# Patient Record
Sex: FEMALE | Race: WHITE | Employment: OTHER | URBAN - METROPOLITAN AREA
[De-identification: names, ages, dates, MRNs, and addresses within clinical notes are randomized per-mention and may not be internally consistent; named-entity substitution may affect disease eponyms.]

---

## 2017-01-04 ENCOUNTER — OFFICE VISIT (OUTPATIENT)
Dept: DERMATOLOGY CLINIC | Facility: CLINIC | Age: 82
End: 2017-01-04

## 2017-01-04 DIAGNOSIS — B07.9 VIRAL WARTS, UNSPECIFIED TYPE: ICD-10-CM

## 2017-01-04 DIAGNOSIS — Z85.828 HISTORY OF SKIN CANCER: ICD-10-CM

## 2017-01-04 DIAGNOSIS — L30.9 DERMATITIS: Primary | ICD-10-CM

## 2017-01-04 DIAGNOSIS — L82.1 SEBORRHEIC KERATOSES: ICD-10-CM

## 2017-01-04 PROCEDURE — G0463 HOSPITAL OUTPT CLINIC VISIT: HCPCS | Performed by: DERMATOLOGY

## 2017-01-04 PROCEDURE — 99213 OFFICE O/P EST LOW 20 MIN: CPT | Performed by: DERMATOLOGY

## 2017-01-27 ENCOUNTER — PRIOR ORIGINAL RECORDS (OUTPATIENT)
Dept: OTHER | Age: 82
End: 2017-01-27

## 2017-01-30 NOTE — PROGRESS NOTES
Rocco Lambert is a 80year old female. Patient presents with:  Warts: Pt here for F/U Warts to bilateral hands. LOV 12/26/16. Dextrose; Levofloxacin; Sulfa Antibiotics    Current Outpatient Prescriptions:   AmLODIPine Besylate 5 MG Oral Tab  Disp:  Rfl:    simvastatin (ZOCOR) 20 MG Oral Tab  Disp:  Rfl:    Losartan Potassium (COZAAR) 50 MG Oral Tab  Disp:  Rfl: 5   cimetidine (TAGAMET) 400 MG Oral Tab Take 1 tablet (400 mg total) by mouth 2 (two) times daily.  Disp: 60 tablet Rfl: 1 Family History   Problem Relation Age of Onset   • Cancer Sister      melanoma   • Melanoma Sister                       HPI :      Patient presents with:  Warts: Pt here for F/U Warts to bilateral hands. LOV 12/26/16.      Patient concerned with rash a improvement. Pathophysiology discussed with patient. Therapeutic options reviewed. See  Medications in grid. Instructions reviewed at length. General skin care questions answered. Reassurance regarding benign skin lesions. Signs and symptoms of sk

## 2017-02-22 ENCOUNTER — LAB ENCOUNTER (OUTPATIENT)
Dept: LAB | Age: 82
End: 2017-02-22
Attending: FAMILY MEDICINE
Payer: MEDICARE

## 2017-02-22 ENCOUNTER — PRIOR ORIGINAL RECORDS (OUTPATIENT)
Dept: OTHER | Age: 82
End: 2017-02-22

## 2017-02-22 DIAGNOSIS — E78.2 MIXED HYPERLIPIDEMIA: Primary | ICD-10-CM

## 2017-02-22 LAB
ALT SERPL-CCNC: 14 U/L (ref 14–54)
AST SERPL-CCNC: 15 U/L (ref 15–41)
CHOLEST SERPL-MCNC: 174 MG/DL (ref 110–200)
HDLC SERPL-MCNC: 57 MG/DL
LDLC SERPL CALC-MCNC: 91 MG/DL (ref 0–99)
NONHDLC SERPL-MCNC: 117 MG/DL
TRIGL SERPL-MCNC: 131 MG/DL (ref 1–149)

## 2017-02-22 PROCEDURE — 80061 LIPID PANEL: CPT

## 2017-02-22 PROCEDURE — 84460 ALANINE AMINO (ALT) (SGPT): CPT

## 2017-02-22 PROCEDURE — 84450 TRANSFERASE (AST) (SGOT): CPT

## 2017-02-22 PROCEDURE — 36415 COLL VENOUS BLD VENIPUNCTURE: CPT

## 2017-02-27 ENCOUNTER — HOSPITAL ENCOUNTER (OUTPATIENT)
Dept: GENERAL RADIOLOGY | Age: 82
Discharge: HOME | End: 2017-02-27
Attending: FAMILY MEDICINE
Payer: MEDICARE

## 2017-02-27 DIAGNOSIS — M25.569 KNEE PAIN: ICD-10-CM

## 2017-02-27 PROCEDURE — 73560 X-RAY EXAM OF KNEE 1 OR 2: CPT

## 2017-04-24 ENCOUNTER — OFFICE VISIT (OUTPATIENT)
Dept: SLEEP CENTER | Age: 82
End: 2017-04-24
Attending: FAMILY MEDICINE
Payer: MEDICARE

## 2017-04-24 DIAGNOSIS — Z76.89 SLEEP CONCERN: Primary | ICD-10-CM

## 2017-04-24 PROCEDURE — 95811 POLYSOM 6/>YRS CPAP 4/> PARM: CPT

## 2017-05-07 NOTE — PROCEDURES
[address] [facility]  Accredited by the [facility] ([facility])    PATIENT'S NAME: Efraín Funes   ATTENDING PHYSICIAN: Alva Yu. Gretchen Telles MD   REFERRING PHYSICIAN: Alva Yu.  Gretchen Telles MD   PATIENT ACCOUNT #: [de-identified] LOCATION: [facility] saturation was 88. The mean oxygen saturation was 90% while on BiPAP of 11/7 cm of water. The CPAP titration was switched to BiPAP titration because the patient had persistent and uncontrolled respiratory events while on CPAP.     ASSESSMENT:    1.   Se

## 2017-05-08 ENCOUNTER — PRIOR ORIGINAL RECORDS (OUTPATIENT)
Dept: OTHER | Age: 82
End: 2017-05-08

## 2017-05-09 ENCOUNTER — PRIOR ORIGINAL RECORDS (OUTPATIENT)
Dept: OTHER | Age: 82
End: 2017-05-09

## 2017-05-18 LAB
ALT (SGPT): 14 U/L
AST (SGOT): 15 U/L
CHOLESTEROL, TOTAL: 174 MG/DL
HDL CHOLESTEROL: 57 MG/DL
LDL CHOLESTEROL: 91 MG/DL
TRIGLYCERIDES: 131 MG/DL

## 2017-05-19 ENCOUNTER — PRIOR ORIGINAL RECORDS (OUTPATIENT)
Dept: OTHER | Age: 82
End: 2017-05-19

## 2017-05-31 ENCOUNTER — PRIOR ORIGINAL RECORDS (OUTPATIENT)
Dept: OTHER | Age: 82
End: 2017-05-31

## 2017-06-22 ENCOUNTER — MYAURORA ACCOUNT LINK (OUTPATIENT)
Dept: OTHER | Age: 82
End: 2017-06-22

## 2017-06-23 ENCOUNTER — PRIOR ORIGINAL RECORDS (OUTPATIENT)
Dept: OTHER | Age: 82
End: 2017-06-23

## 2017-07-05 ENCOUNTER — PRIOR ORIGINAL RECORDS (OUTPATIENT)
Dept: OTHER | Age: 82
End: 2017-07-05

## 2018-01-19 ENCOUNTER — PRIOR ORIGINAL RECORDS (OUTPATIENT)
Dept: OTHER | Age: 83
End: 2018-01-19

## 2018-02-07 ENCOUNTER — OFFICE VISIT (OUTPATIENT)
Dept: DERMATOLOGY CLINIC | Facility: CLINIC | Age: 83
End: 2018-02-07

## 2018-02-07 DIAGNOSIS — Z85.820 PERSONAL HISTORY OF MALIGNANT MELANOMA OF SKIN: ICD-10-CM

## 2018-02-07 DIAGNOSIS — D23.5 BENIGN NEOPLASM OF SKIN OF TRUNK, EXCEPT SCROTUM: ICD-10-CM

## 2018-02-07 DIAGNOSIS — D23.4 BENIGN NEOPLASM OF SCALP AND SKIN OF NECK: ICD-10-CM

## 2018-02-07 DIAGNOSIS — D23.60 BENIGN NEOPLASM OF SKIN OF UPPER LIMB, INCLUDING SHOULDER, UNSPECIFIED LATERALITY: ICD-10-CM

## 2018-02-07 DIAGNOSIS — L82.0 INFLAMED SEBORRHEIC KERATOSIS: ICD-10-CM

## 2018-02-07 DIAGNOSIS — L57.0 AK (ACTINIC KERATOSIS): ICD-10-CM

## 2018-02-07 DIAGNOSIS — D23.70 BENIGN NEOPLASM OF SKIN OF LOWER LIMB, INCLUDING HIP, UNSPECIFIED LATERALITY: ICD-10-CM

## 2018-02-07 DIAGNOSIS — L30.9 DERMATITIS: Primary | ICD-10-CM

## 2018-02-07 DIAGNOSIS — L82.1 SEBORRHEIC KERATOSES: ICD-10-CM

## 2018-02-07 DIAGNOSIS — D23.30 BENIGN NEOPLASM OF SKIN OF FACE: ICD-10-CM

## 2018-02-07 DIAGNOSIS — Z85.828 HISTORY OF SKIN CANCER: ICD-10-CM

## 2018-02-07 PROCEDURE — 99213 OFFICE O/P EST LOW 20 MIN: CPT | Performed by: DERMATOLOGY

## 2018-02-07 PROCEDURE — G0463 HOSPITAL OUTPT CLINIC VISIT: HCPCS | Performed by: DERMATOLOGY

## 2018-02-15 ENCOUNTER — MYAURORA ACCOUNT LINK (OUTPATIENT)
Dept: OTHER | Age: 83
End: 2018-02-15

## 2018-02-18 ENCOUNTER — PRIOR ORIGINAL RECORDS (OUTPATIENT)
Dept: OTHER | Age: 83
End: 2018-02-18

## 2018-02-18 NOTE — PROGRESS NOTES
Mario Yao is a 80year old female. HPI:     CC:  Patient presents with:  Full Skin Exam: LOV 1/2017. Pt resquesting full skin exam. Pt concerned with dry lesion on right middle finger. Twin sister has hx of melanoma.  Personal hx of BCC and me Comment:Other reaction(s): Pruritis  Levofloxacin                Comment:Other reaction(s): Pruritis  Sulfa Antibiotics           Comment:Other reaction(s): Hives    Past Medical History:   Diagnosis Date   • Basal cell carcinoma 2014    per Well-developed well-nourished patient alert oriented in no acute distress.   Exam total-body performed, including scalp, head, neck, face,nails, hair, external eyes, including conjunctival mucosa, eyelids, lips external ears, back, chest,/ breasts, axillae, See additional diagnoses. Pros cons of various therapies, risks benefits discussed. Pathophysiology discussed with patient. Therapeutic options reviewed. See  Medications in grid. Instructions reviewed at length.     Benign nevi, seborrheic  keratoses, c

## 2018-02-21 ENCOUNTER — PRIOR ORIGINAL RECORDS (OUTPATIENT)
Dept: OTHER | Age: 83
End: 2018-02-21

## 2018-02-22 ENCOUNTER — APPOINTMENT (OUTPATIENT)
Dept: LAB | Age: 83
End: 2018-02-22
Attending: INTERNAL MEDICINE
Payer: MEDICARE

## 2018-02-22 ENCOUNTER — PRIOR ORIGINAL RECORDS (OUTPATIENT)
Dept: OTHER | Age: 83
End: 2018-02-22

## 2018-02-22 DIAGNOSIS — I15.9 SECONDARY HYPERTENSION: ICD-10-CM

## 2018-02-22 LAB
ANION GAP SERPL CALC-SCNC: 8 MMOL/L (ref 0–18)
BUN SERPL-MCNC: 27 MG/DL (ref 8–20)
BUN/CREAT SERPL: 19.9 (ref 10–20)
CALCIUM SERPL-MCNC: 10.3 MG/DL (ref 8.5–10.5)
CHLORIDE SERPL-SCNC: 107 MMOL/L (ref 95–110)
CO2 SERPL-SCNC: 26 MMOL/L (ref 22–32)
CREAT SERPL-MCNC: 1.36 MG/DL (ref 0.5–1.5)
GLUCOSE SERPL-MCNC: 102 MG/DL (ref 70–99)
OSMOLALITY UR CALC.SUM OF ELEC: 297 MOSM/KG (ref 275–295)
POTASSIUM SERPL-SCNC: 4.4 MMOL/L (ref 3.3–5.1)
SODIUM SERPL-SCNC: 141 MMOL/L (ref 136–144)

## 2018-02-22 PROCEDURE — 36415 COLL VENOUS BLD VENIPUNCTURE: CPT

## 2018-02-22 PROCEDURE — 80048 BASIC METABOLIC PNL TOTAL CA: CPT

## 2018-02-23 ENCOUNTER — PRIOR ORIGINAL RECORDS (OUTPATIENT)
Dept: OTHER | Age: 83
End: 2018-02-23

## 2018-02-23 LAB
BUN: 27 MG/DL
BUN: 27 MG/DL
CALCIUM: 10.3 MG/DL
CALCIUM: 10.3 MG/DL
CHLORIDE: 107 MEQ/L
CREATININE, SERUM: 1.36 MG/DL
CREATININE, SERUM: 1.36 MG/DL
GLUCOSE: 102 MG/DL
GLUCOSE: 102 MG/DL
POTASSIUM, SERUM: 4.4 MEQ/L
POTASSIUM, SERUM: 4.4 MEQ/L
SODIUM: 141 MEQ/L
SODIUM: 141 MEQ/L

## 2018-02-28 ENCOUNTER — HOSPITAL ENCOUNTER (OUTPATIENT)
Dept: MRI IMAGING | Facility: HOSPITAL | Age: 83
Discharge: HOME | End: 2018-02-28
Attending: INTERNAL MEDICINE
Payer: MEDICARE

## 2018-02-28 DIAGNOSIS — I10 ESSENTIAL HYPERTENSION, BENIGN: ICD-10-CM

## 2018-02-28 PROCEDURE — 74185 MRA ABD W OR W/O CNTRST: CPT | Performed by: INTERNAL MEDICINE

## 2018-02-28 PROCEDURE — A9575 INJ GADOTERATE MEGLUMI 0.1ML: HCPCS | Performed by: INTERNAL MEDICINE

## 2018-03-08 ENCOUNTER — PRIOR ORIGINAL RECORDS (OUTPATIENT)
Dept: OTHER | Age: 83
End: 2018-03-08

## 2018-03-12 ENCOUNTER — PRIOR ORIGINAL RECORDS (OUTPATIENT)
Dept: OTHER | Age: 83
End: 2018-03-12

## 2018-03-16 ENCOUNTER — PRIOR ORIGINAL RECORDS (OUTPATIENT)
Dept: OTHER | Age: 83
End: 2018-03-16

## 2018-03-20 ENCOUNTER — LAB ENCOUNTER (OUTPATIENT)
Dept: LAB | Age: 83
End: 2018-03-20
Attending: FAMILY MEDICINE
Payer: MEDICARE

## 2018-03-20 ENCOUNTER — HOSPITAL ENCOUNTER (OUTPATIENT)
Dept: MAMMOGRAPHY | Age: 83
Discharge: HOME | End: 2018-03-20
Attending: FAMILY MEDICINE
Payer: MEDICARE

## 2018-03-20 DIAGNOSIS — E78.1 HYPERGLYCERIDEMIA: Primary | ICD-10-CM

## 2018-03-20 DIAGNOSIS — Z12.31 ENCOUNTER FOR SCREENING MAMMOGRAM FOR MALIGNANT NEOPLASM OF BREAST: ICD-10-CM

## 2018-03-20 PROCEDURE — 77067 SCR MAMMO BI INCL CAD: CPT | Performed by: FAMILY MEDICINE

## 2018-03-21 ENCOUNTER — APPOINTMENT (OUTPATIENT)
Dept: LAB | Age: 83
End: 2018-03-21
Attending: FAMILY MEDICINE
Payer: MEDICARE

## 2018-03-21 DIAGNOSIS — E78.1 HYPERGLYCERIDEMIA: ICD-10-CM

## 2018-03-21 LAB
ALBUMIN SERPL BCP-MCNC: 3.7 G/DL (ref 3.5–4.8)
ALBUMIN/GLOB SERPL: 1.2 {RATIO} (ref 1–2)
ALP SERPL-CCNC: 44 U/L (ref 32–100)
ALT SERPL-CCNC: 17 U/L (ref 14–54)
ANION GAP SERPL CALC-SCNC: 6 MMOL/L (ref 0–18)
AST SERPL-CCNC: 18 U/L (ref 15–41)
BILIRUB SERPL-MCNC: 0.8 MG/DL (ref 0.3–1.2)
BUN SERPL-MCNC: 23 MG/DL (ref 8–20)
BUN/CREAT SERPL: 19.7 (ref 10–20)
CALCIUM SERPL-MCNC: 10.5 MG/DL (ref 8.5–10.5)
CHLORIDE SERPL-SCNC: 108 MMOL/L (ref 95–110)
CHOLEST SERPL-MCNC: 151 MG/DL (ref 110–200)
CO2 SERPL-SCNC: 26 MMOL/L (ref 22–32)
CREAT SERPL-MCNC: 1.17 MG/DL (ref 0.5–1.5)
GLOBULIN PLAS-MCNC: 3.2 G/DL (ref 2.5–3.7)
GLUCOSE SERPL-MCNC: 96 MG/DL (ref 70–99)
HDLC SERPL-MCNC: 52 MG/DL
LDLC SERPL CALC-MCNC: 73 MG/DL (ref 0–99)
NONHDLC SERPL-MCNC: 99 MG/DL
OSMOLALITY UR CALC.SUM OF ELEC: 294 MOSM/KG (ref 275–295)
PATIENT FASTING: YES
POTASSIUM SERPL-SCNC: 4.2 MMOL/L (ref 3.3–5.1)
PROT SERPL-MCNC: 6.9 G/DL (ref 5.9–8.4)
SODIUM SERPL-SCNC: 140 MMOL/L (ref 136–144)
TRIGL SERPL-MCNC: 130 MG/DL (ref 1–149)

## 2018-03-21 PROCEDURE — 80061 LIPID PANEL: CPT

## 2018-03-21 PROCEDURE — 36415 COLL VENOUS BLD VENIPUNCTURE: CPT

## 2018-03-21 PROCEDURE — 80053 COMPREHEN METABOLIC PANEL: CPT

## 2018-03-26 ENCOUNTER — HOSPITAL ENCOUNTER (OUTPATIENT)
Dept: MAMMOGRAPHY | Facility: HOSPITAL | Age: 83
Discharge: HOME | End: 2018-03-26
Attending: FAMILY MEDICINE
Payer: MEDICARE

## 2018-03-26 DIAGNOSIS — R92.8 ABNORMAL MAMMOGRAM: ICD-10-CM

## 2018-03-26 PROCEDURE — 77065 DX MAMMO INCL CAD UNI: CPT | Performed by: FAMILY MEDICINE

## 2018-07-13 ENCOUNTER — MYAURORA ACCOUNT LINK (OUTPATIENT)
Dept: OTHER | Age: 83
End: 2018-07-13

## 2018-07-13 ENCOUNTER — PRIOR ORIGINAL RECORDS (OUTPATIENT)
Dept: OTHER | Age: 83
End: 2018-07-13

## 2018-08-06 ENCOUNTER — OFFICE VISIT (OUTPATIENT)
Dept: DERMATOLOGY CLINIC | Facility: CLINIC | Age: 83
End: 2018-08-06
Payer: MEDICARE

## 2018-08-06 DIAGNOSIS — L57.0 AK (ACTINIC KERATOSIS): Primary | ICD-10-CM

## 2018-08-06 DIAGNOSIS — L82.0 INFLAMED SEBORRHEIC KERATOSIS: ICD-10-CM

## 2018-08-06 DIAGNOSIS — D23.5 BENIGN NEOPLASM OF SKIN OF TRUNK, EXCEPT SCROTUM: ICD-10-CM

## 2018-08-06 DIAGNOSIS — D23.60 BENIGN NEOPLASM OF SKIN OF UPPER LIMB, INCLUDING SHOULDER, UNSPECIFIED LATERALITY: ICD-10-CM

## 2018-08-06 DIAGNOSIS — B07.9 VIRAL WARTS, UNSPECIFIED TYPE: ICD-10-CM

## 2018-08-06 DIAGNOSIS — Z85.820 PERSONAL HISTORY OF MALIGNANT MELANOMA OF SKIN: ICD-10-CM

## 2018-08-06 DIAGNOSIS — D23.30 BENIGN NEOPLASM OF SKIN OF FACE: ICD-10-CM

## 2018-08-06 DIAGNOSIS — D23.4 BENIGN NEOPLASM OF SCALP AND SKIN OF NECK: ICD-10-CM

## 2018-08-06 DIAGNOSIS — Z85.828 HISTORY OF SKIN CANCER: ICD-10-CM

## 2018-08-06 PROCEDURE — 99213 OFFICE O/P EST LOW 20 MIN: CPT | Performed by: DERMATOLOGY

## 2018-08-06 PROCEDURE — G0463 HOSPITAL OUTPT CLINIC VISIT: HCPCS | Performed by: DERMATOLOGY

## 2018-08-19 NOTE — PROGRESS NOTES
Ebonie Elise is a 80year old female. HPI:     CC:  Patient presents with:  Full Skin Exam: LOV 2/7/2018. Pt presenting for 6 month full body skin exam. Pt has a personal hx of BCC and melanoma. Moles: Pt concerned with mole to L arm.          A COMMENTS)    Comment:Just remembers she never wants to take the             medication ever again.              Other reaction(s): Pruritis  Sulfa Antibiotics       OTHER (SEE COMMENTS)    Comment:Her twin sister is allergic so they assume she is No fevers, chills, night sweats, unusual sun sensitivity. No other skin complaints. History, medications, allergies reviewed as noted. Physical Examination:     Well-developed well-nourished patient alert oriented in no acute distress.   Exam goes nevi. No new suspicious pigmented lesions. No recurrence of prior melanoma, BCC no active AK's    No other new suspicious lesions  Please refer to map for specific lesions. See additional diagnoses.   Pros cons of various therapies, risks benefits d

## 2019-01-01 ENCOUNTER — EXTERNAL RECORD (OUTPATIENT)
Dept: HEALTH INFORMATION MANAGEMENT | Facility: OTHER | Age: 84
End: 2019-01-01

## 2019-01-23 ENCOUNTER — PRIOR ORIGINAL RECORDS (OUTPATIENT)
Dept: OTHER | Age: 84
End: 2019-01-23

## 2019-02-05 ENCOUNTER — PRIOR ORIGINAL RECORDS (OUTPATIENT)
Dept: OTHER | Age: 84
End: 2019-02-05

## 2019-02-05 ENCOUNTER — APPOINTMENT (OUTPATIENT)
Dept: LAB | Age: 84
End: 2019-02-05
Attending: INTERNAL MEDICINE
Payer: MEDICARE

## 2019-02-05 DIAGNOSIS — E78.00 HYPERCHOLESTEREMIA: ICD-10-CM

## 2019-02-05 DIAGNOSIS — I25.119 CORONARY ARTERY DISEASE WITH ANGINA PECTORIS, UNSPECIFIED VESSEL OR LESION TYPE, UNSPECIFIED WHETHER NATIVE OR TRANSPLANTED HEART (HCC): ICD-10-CM

## 2019-02-05 LAB
ALT SERPL-CCNC: 13 U/L (ref 14–54)
ANION GAP SERPL CALC-SCNC: 11 MMOL/L (ref 0–18)
AST SERPL-CCNC: 16 U/L (ref 15–41)
BUN SERPL-MCNC: 21 MG/DL (ref 8–20)
BUN/CREAT SERPL: 18.1 (ref 10–20)
CALCIUM SERPL-MCNC: 10.6 MG/DL (ref 8.5–10.5)
CHLORIDE SERPL-SCNC: 106 MMOL/L (ref 95–110)
CHOLEST SERPL-MCNC: 167 MG/DL (ref 110–200)
CO2 SERPL-SCNC: 24 MMOL/L (ref 22–32)
CREAT SERPL-MCNC: 1.16 MG/DL (ref 0.5–1.5)
GLUCOSE SERPL-MCNC: 110 MG/DL (ref 70–99)
HDLC SERPL-MCNC: 56 MG/DL
LDLC SERPL CALC-MCNC: 85 MG/DL (ref 0–99)
NONHDLC SERPL-MCNC: 111 MG/DL
OSMOLALITY UR CALC.SUM OF ELEC: 296 MOSM/KG (ref 275–295)
POTASSIUM SERPL-SCNC: 4.1 MMOL/L (ref 3.3–5.1)
SODIUM SERPL-SCNC: 141 MMOL/L (ref 136–144)
TRIGL SERPL-MCNC: 131 MG/DL (ref 1–149)

## 2019-02-05 PROCEDURE — 84450 TRANSFERASE (AST) (SGOT): CPT

## 2019-02-05 PROCEDURE — 36415 COLL VENOUS BLD VENIPUNCTURE: CPT

## 2019-02-05 PROCEDURE — 84460 ALANINE AMINO (ALT) (SGPT): CPT

## 2019-02-05 PROCEDURE — 80061 LIPID PANEL: CPT

## 2019-02-05 PROCEDURE — 80048 BASIC METABOLIC PNL TOTAL CA: CPT

## 2019-02-06 LAB
ALT (SGPT): 13 U/L
AST (SGOT): 16 U/L
BUN: 21 MG/DL
CALCIUM: 10.6 MG/DL
CHLORIDE: 106 MEQ/L
CHOLESTEROL, TOTAL: 167 MG/DL
CREATININE, SERUM: 1.16 MG/DL
GLUCOSE: 110 MG/DL
GLUCOSE: 110 MG/DL
HDL CHOLESTEROL: 56 MG/DL
LDL CHOLESTEROL: 85 MG/DL
POTASSIUM, SERUM: 4.1 MEQ/L
SGOT (AST): 16 IU/L
SGPT (ALT): 13 IU/L
SODIUM: 141 MEQ/L
TRIGLYCERIDES: 131 MG/DL

## 2019-02-28 VITALS
HEIGHT: 64 IN | OXYGEN SATURATION: 97 % | WEIGHT: 196 LBS | HEART RATE: 48 BPM | SYSTOLIC BLOOD PRESSURE: 178 MMHG | BODY MASS INDEX: 33.46 KG/M2 | DIASTOLIC BLOOD PRESSURE: 84 MMHG

## 2019-02-28 VITALS
DIASTOLIC BLOOD PRESSURE: 64 MMHG | HEART RATE: 54 BPM | BODY MASS INDEX: 34.49 KG/M2 | RESPIRATION RATE: 18 BRPM | SYSTOLIC BLOOD PRESSURE: 120 MMHG | OXYGEN SATURATION: 94 % | HEIGHT: 64 IN | WEIGHT: 202 LBS

## 2019-02-28 VITALS
HEART RATE: 49 BPM | WEIGHT: 193 LBS | OXYGEN SATURATION: 96 % | DIASTOLIC BLOOD PRESSURE: 80 MMHG | HEIGHT: 64 IN | BODY MASS INDEX: 32.95 KG/M2 | SYSTOLIC BLOOD PRESSURE: 124 MMHG

## 2019-03-01 VITALS
HEIGHT: 65 IN | OXYGEN SATURATION: 91 % | SYSTOLIC BLOOD PRESSURE: 134 MMHG | WEIGHT: 205 LBS | HEART RATE: 62 BPM | BODY MASS INDEX: 34.16 KG/M2 | DIASTOLIC BLOOD PRESSURE: 62 MMHG

## 2019-03-04 RX ORDER — METOPROLOL TARTRATE 50 MG/1
1 TABLET, FILM COATED ORAL 2 TIMES DAILY
COMMUNITY
Start: 2018-09-24 | End: 2019-04-19 | Stop reason: SDUPTHER

## 2019-03-04 RX ORDER — CRANBERRY FRUIT EXTRACT 200 MG
CAPSULE ORAL
COMMUNITY
Start: 2015-07-24

## 2019-03-04 RX ORDER — AMLODIPINE BESYLATE 5 MG/1
1 TABLET ORAL 2 TIMES DAILY
COMMUNITY
Start: 2018-07-13 | End: 2019-07-10 | Stop reason: SDUPTHER

## 2019-03-04 RX ORDER — CLOPIDOGREL BISULFATE 75 MG/1
1 TABLET ORAL DAILY
COMMUNITY
Start: 2018-09-17 | End: 2019-10-12 | Stop reason: SDUPTHER

## 2019-03-04 RX ORDER — LOSARTAN POTASSIUM 50 MG/1
1 TABLET ORAL 2 TIMES DAILY
COMMUNITY
Start: 2018-07-13 | End: 2019-04-19 | Stop reason: SDUPTHER

## 2019-03-04 RX ORDER — SIMVASTATIN 20 MG
1 TABLET ORAL AT BEDTIME
COMMUNITY
Start: 2018-11-26 | End: 2019-09-21 | Stop reason: SDUPTHER

## 2019-03-04 RX ORDER — CALCIUM CARBONATE 500(1250)
TABLET ORAL
COMMUNITY
End: 2019-03-07 | Stop reason: SDUPTHER

## 2019-03-04 RX ORDER — LEVOTHYROXINE SODIUM 0.07 MG/1
75 TABLET ORAL DAILY
COMMUNITY
Start: 2015-04-14

## 2019-03-04 RX ORDER — VIT A/VIT C/VIT E/ZINC/COPPER 4296-226
CAPSULE ORAL
COMMUNITY
Start: 2016-01-29 | End: 2019-03-04 | Stop reason: CLARIF

## 2019-03-08 ENCOUNTER — LAB ENCOUNTER (OUTPATIENT)
Dept: LAB | Age: 84
End: 2019-03-08
Attending: FAMILY MEDICINE
Payer: MEDICARE

## 2019-03-08 DIAGNOSIS — E89.0 POST-OPERATIVE HYPOTHYROIDISM: ICD-10-CM

## 2019-03-08 DIAGNOSIS — R53.83 FATIGUE: Primary | ICD-10-CM

## 2019-03-08 LAB
BASOPHILS # BLD AUTO: 0.03 X10(3) UL (ref 0–0.2)
BASOPHILS NFR BLD AUTO: 0.4 %
DEPRECATED RDW RBC AUTO: 53.3 FL (ref 35.1–46.3)
EOSINOPHIL # BLD AUTO: 0.04 X10(3) UL (ref 0–0.7)
EOSINOPHIL NFR BLD AUTO: 0.5 %
ERYTHROCYTE [DISTWIDTH] IN BLOOD BY AUTOMATED COUNT: 14.3 % (ref 11–15)
HCT VFR BLD AUTO: 39.5 % (ref 35–48)
HGB BLD-MCNC: 12.9 G/DL (ref 12–16)
IMM GRANULOCYTES # BLD AUTO: 0.03 X10(3) UL (ref 0–1)
IMM GRANULOCYTES NFR BLD: 0.4 %
LYMPHOCYTES # BLD AUTO: 0.85 X10(3) UL (ref 1–4)
LYMPHOCYTES NFR BLD AUTO: 11.5 %
MCH RBC QN AUTO: 33.2 PG (ref 26–34)
MCHC RBC AUTO-ENTMCNC: 32.7 G/DL (ref 31–37)
MCV RBC AUTO: 101.5 FL (ref 80–100)
MONOCYTES # BLD AUTO: 0.74 X10(3) UL (ref 0.1–1)
MONOCYTES NFR BLD AUTO: 10 %
NEUTROPHILS # BLD AUTO: 5.73 X10 (3) UL (ref 1.5–7.7)
NEUTROPHILS # BLD AUTO: 5.73 X10(3) UL (ref 1.5–7.7)
NEUTROPHILS NFR BLD AUTO: 77.2 %
PLATELET # BLD AUTO: 259 10(3)UL (ref 150–450)
RBC # BLD AUTO: 3.89 X10(6)UL (ref 3.8–5.3)
T4 FREE SERPL-MCNC: 1.2 NG/DL (ref 0.8–1.7)
TSI SER-ACNC: 2.23 MIU/ML (ref 0.36–3.74)
WBC # BLD AUTO: 7.4 X10(3) UL (ref 4–11)

## 2019-03-08 PROCEDURE — 84443 ASSAY THYROID STIM HORMONE: CPT

## 2019-03-08 PROCEDURE — 84439 ASSAY OF FREE THYROXINE: CPT

## 2019-03-08 PROCEDURE — 36415 COLL VENOUS BLD VENIPUNCTURE: CPT

## 2019-03-08 PROCEDURE — 85025 COMPLETE CBC W/AUTO DIFF WBC: CPT

## 2019-04-17 RX ORDER — HYDROCODONE BITARTRATE AND ACETAMINOPHEN 5; 325 MG/1; MG/1
1-2 TABLET ORAL
COMMUNITY
Start: 2016-06-15 | End: 2019-04-22

## 2019-04-17 RX ORDER — POLYETHYLENE GLYCOL 3350 17 G/17G
17 POWDER, FOR SOLUTION ORAL DAILY
COMMUNITY
Start: 2016-06-15 | End: 2019-04-22

## 2019-04-17 RX ORDER — OMEPRAZOLE 20 MG/1
20 CAPSULE, DELAYED RELEASE ORAL DAILY
COMMUNITY
End: 2019-04-22

## 2019-04-17 RX ORDER — LIDOCAINE 50 MG/G
1 PATCH TOPICAL
COMMUNITY
Start: 2016-06-15 | End: 2019-04-22

## 2019-04-19 ENCOUNTER — OFFICE VISIT (OUTPATIENT)
Dept: CARDIOLOGY | Age: 84
End: 2019-04-19

## 2019-04-19 VITALS
BODY MASS INDEX: 32.27 KG/M2 | HEART RATE: 54 BPM | SYSTOLIC BLOOD PRESSURE: 150 MMHG | HEIGHT: 64 IN | DIASTOLIC BLOOD PRESSURE: 78 MMHG | WEIGHT: 189 LBS | OXYGEN SATURATION: 90 %

## 2019-04-19 DIAGNOSIS — I73.9 PERIPHERAL VASCULAR DISEASE (CMD): Primary | ICD-10-CM

## 2019-04-19 DIAGNOSIS — E78.5 DYSLIPIDEMIA: ICD-10-CM

## 2019-04-19 DIAGNOSIS — I10 ESSENTIAL HYPERTENSION: ICD-10-CM

## 2019-04-19 PROBLEM — I25.10 CORONARY ARTERY DISEASE INVOLVING NATIVE CORONARY ARTERY OF NATIVE HEART WITHOUT ANGINA PECTORIS: Status: ACTIVE | Noted: 2019-04-19

## 2019-04-19 PROCEDURE — 99214 OFFICE O/P EST MOD 30 MIN: CPT | Performed by: INTERNAL MEDICINE

## 2019-04-19 RX ORDER — LOSARTAN POTASSIUM 100 MG/1
100 TABLET ORAL 2 TIMES DAILY
Qty: 180 TABLET | Refills: 3 | Status: SHIPPED | OUTPATIENT
Start: 2019-04-19 | End: 2019-05-21 | Stop reason: SDUPTHER

## 2019-04-19 RX ORDER — LOSARTAN POTASSIUM 100 MG/1
100 TABLET ORAL 2 TIMES DAILY
Qty: 60 TABLET | Refills: 5 | Status: SHIPPED | OUTPATIENT
Start: 2019-04-19 | End: 2019-04-19

## 2019-04-19 SDOH — HEALTH STABILITY: MENTAL HEALTH
STRESS IS WHEN SOMEONE FEELS TENSE, NERVOUS, ANXIOUS, OR CAN'T SLEEP AT NIGHT BECAUSE THEIR MIND IS TROUBLED. HOW STRESSED ARE YOU?: NOT AT ALL

## 2019-04-19 SDOH — HEALTH STABILITY: PHYSICAL HEALTH: ON AVERAGE, HOW MANY DAYS PER WEEK DO YOU ENGAGE IN MODERATE TO STRENUOUS EXERCISE (LIKE A BRISK WALK)?: 0 DAYS

## 2019-04-19 SDOH — HEALTH STABILITY: PHYSICAL HEALTH: ON AVERAGE, HOW MANY MINUTES DO YOU ENGAGE IN EXERCISE AT THIS LEVEL?: 0 MIN

## 2019-04-22 ENCOUNTER — TELEPHONE (OUTPATIENT)
Dept: CARDIOLOGY | Age: 84
End: 2019-04-22

## 2019-04-22 RX ORDER — LOSARTAN POTASSIUM 100 MG/1
100 TABLET ORAL 2 TIMES DAILY
Qty: 180 TABLET | Refills: 3 | Status: SHIPPED | OUTPATIENT
Start: 2019-04-22 | End: 2019-05-21 | Stop reason: SDUPTHER

## 2019-05-09 ENCOUNTER — TELEPHONE (OUTPATIENT)
Dept: CARDIOLOGY | Age: 84
End: 2019-05-09

## 2019-05-17 ENCOUNTER — OFFICE VISIT (OUTPATIENT)
Dept: DERMATOLOGY CLINIC | Facility: CLINIC | Age: 84
End: 2019-05-17
Payer: MEDICARE

## 2019-05-17 DIAGNOSIS — D23.60 BENIGN NEOPLASM OF SKIN OF UPPER LIMB, INCLUDING SHOULDER, UNSPECIFIED LATERALITY: ICD-10-CM

## 2019-05-17 DIAGNOSIS — L82.0 INFLAMED SEBORRHEIC KERATOSIS: ICD-10-CM

## 2019-05-17 DIAGNOSIS — L57.0 AK (ACTINIC KERATOSIS): Primary | ICD-10-CM

## 2019-05-17 DIAGNOSIS — D23.5 BENIGN NEOPLASM OF SKIN OF TRUNK, EXCEPT SCROTUM: ICD-10-CM

## 2019-05-17 DIAGNOSIS — B07.9 VIRAL WARTS, UNSPECIFIED TYPE: ICD-10-CM

## 2019-05-17 DIAGNOSIS — D23.30 BENIGN NEOPLASM OF SKIN OF FACE: ICD-10-CM

## 2019-05-17 DIAGNOSIS — Z85.828 HISTORY OF SKIN CANCER: ICD-10-CM

## 2019-05-17 DIAGNOSIS — D23.4 BENIGN NEOPLASM OF SCALP AND SKIN OF NECK: ICD-10-CM

## 2019-05-17 DIAGNOSIS — D23.70 BENIGN NEOPLASM OF SKIN OF LOWER LIMB, INCLUDING HIP, UNSPECIFIED LATERALITY: ICD-10-CM

## 2019-05-17 DIAGNOSIS — L30.9 DERMATITIS: ICD-10-CM

## 2019-05-17 DIAGNOSIS — L82.1 SEBORRHEIC KERATOSES: ICD-10-CM

## 2019-05-17 DIAGNOSIS — Z85.820 PERSONAL HISTORY OF MALIGNANT MELANOMA OF SKIN: ICD-10-CM

## 2019-05-17 PROCEDURE — 99213 OFFICE O/P EST LOW 20 MIN: CPT | Performed by: DERMATOLOGY

## 2019-05-17 PROCEDURE — G0463 HOSPITAL OUTPT CLINIC VISIT: HCPCS | Performed by: DERMATOLOGY

## 2019-05-17 RX ORDER — NITROFURANTOIN MACROCRYSTALS 50 MG/1
CAPSULE ORAL
COMMUNITY
Start: 2019-03-08 | End: 2019-10-09 | Stop reason: ALTCHOICE

## 2019-05-17 RX ORDER — AMOXICILLIN 500 MG/1
CAPSULE ORAL
COMMUNITY
Start: 2019-03-22 | End: 2019-10-09 | Stop reason: ALTCHOICE

## 2019-05-17 RX ORDER — CRANBERRY FRUIT EXTRACT 200 MG
CAPSULE ORAL
COMMUNITY
Start: 2015-07-24

## 2019-05-20 ENCOUNTER — TELEPHONE (OUTPATIENT)
Dept: CARDIOLOGY | Age: 84
End: 2019-05-20

## 2019-05-21 ENCOUNTER — HOSPITAL ENCOUNTER (OUTPATIENT)
Dept: GENERAL RADIOLOGY | Age: 84
Discharge: HOME | End: 2019-05-21
Attending: FAMILY MEDICINE
Payer: MEDICARE

## 2019-05-21 ENCOUNTER — LAB ENCOUNTER (OUTPATIENT)
Dept: LAB | Age: 84
End: 2019-05-21
Attending: FAMILY MEDICINE
Payer: MEDICARE

## 2019-05-21 DIAGNOSIS — M54.50 LOW BACK PAIN: ICD-10-CM

## 2019-05-21 DIAGNOSIS — Z80.41 FAMILY HISTORY OF OVARIAN CANCER: Primary | ICD-10-CM

## 2019-05-21 PROCEDURE — 86304 IMMUNOASSAY TUMOR CA 125: CPT

## 2019-05-21 PROCEDURE — 36415 COLL VENOUS BLD VENIPUNCTURE: CPT

## 2019-05-21 PROCEDURE — 72110 X-RAY EXAM L-2 SPINE 4/>VWS: CPT | Performed by: FAMILY MEDICINE

## 2019-05-21 RX ORDER — LOSARTAN POTASSIUM 100 MG/1
100 TABLET ORAL 2 TIMES DAILY
Qty: 180 TABLET | Refills: 3 | Status: SHIPPED | OUTPATIENT
Start: 2019-05-21 | End: 2019-05-31 | Stop reason: DRUGHIGH

## 2019-05-27 NOTE — PROGRESS NOTES
Veena Pugh is a 80year old female. HPI:     CC:  Patient presents with:  Full Skin Exam: LOV 8/6/18. pt presenting today for full body skin check.  pt has personal HX of BCC and Melanoma  Moles: Pt concerned about moles to chest.        Allerg Take  by mouth.  Disp:  Rfl:      Allergies:     Dextrose                OTHER (SEE COMMENTS)    Comment:Other reaction(s): Pruritis             Other reaction(s): Pruritis  Levofloxacin            OTHER (SEE COMMENTS)    Comment:Just remembers she never wa phone: Not on file        Gets together: Not on file        Attends Holiness service: Not on file        Active member of club or organization: Not on file        Attends meetings of clubs or organizations: Not on file        Relationship status: Not on f acute distress. Exam total-body performed, including scalp, head, neck, face,nails, hair, external eyes, including conjunctival mucosa, eyelids, lips external ears, back, chest,/ breasts, axillae,  abdomen, arms, legs, palms.      Multiple light to medium Patient doing less wet work. Continue careful hand care reassurance. Small verrucous papule at left dorsal index finger . Other words stable. Appear clear. More ganglion changes over distal IP joints, arthritis prominent changes.   Prominent arthritis

## 2019-05-29 PROBLEM — R00.1 BRADYCARDIA: Status: ACTIVE | Noted: 2019-05-29

## 2019-05-30 RX ORDER — AMOXICILLIN 500 MG/1
CAPSULE ORAL
COMMUNITY
Start: 2019-03-22 | End: 2019-07-23

## 2019-05-30 RX ORDER — NITROFURANTOIN MACROCRYSTALS 50 MG/1
CAPSULE ORAL
COMMUNITY
Start: 2019-03-08 | End: 2019-05-31

## 2019-05-31 ENCOUNTER — TELEPHONE (OUTPATIENT)
Dept: CARDIOLOGY | Age: 84
End: 2019-05-31

## 2019-05-31 ENCOUNTER — OFFICE VISIT (OUTPATIENT)
Dept: CARDIOLOGY | Age: 84
End: 2019-05-31

## 2019-05-31 VITALS
HEIGHT: 64 IN | SYSTOLIC BLOOD PRESSURE: 158 MMHG | DIASTOLIC BLOOD PRESSURE: 64 MMHG | BODY MASS INDEX: 32.27 KG/M2 | HEART RATE: 59 BPM | WEIGHT: 189 LBS | OXYGEN SATURATION: 94 %

## 2019-05-31 DIAGNOSIS — I10 ESSENTIAL HYPERTENSION: Primary | ICD-10-CM

## 2019-05-31 DIAGNOSIS — R00.1 BRADYCARDIA: ICD-10-CM

## 2019-05-31 DIAGNOSIS — I25.10 CORONARY ARTERY DISEASE INVOLVING NATIVE CORONARY ARTERY OF NATIVE HEART WITHOUT ANGINA PECTORIS: ICD-10-CM

## 2019-05-31 PROCEDURE — 93000 ELECTROCARDIOGRAM COMPLETE: CPT | Performed by: NURSE PRACTITIONER

## 2019-05-31 PROCEDURE — 99214 OFFICE O/P EST MOD 30 MIN: CPT | Performed by: NURSE PRACTITIONER

## 2019-05-31 RX ORDER — LOSARTAN POTASSIUM 100 MG/1
100 TABLET ORAL DAILY
COMMUNITY
End: 2020-06-29

## 2019-05-31 RX ORDER — SPIRONOLACTONE 25 MG/1
TABLET ORAL
Qty: 30 TABLET | Refills: 2 | Status: SHIPPED | OUTPATIENT
Start: 2019-05-31 | End: 2019-07-23

## 2019-07-11 ENCOUNTER — TELEPHONE (OUTPATIENT)
Dept: CARDIOLOGY | Age: 84
End: 2019-07-11

## 2019-07-11 RX ORDER — AMLODIPINE BESYLATE 5 MG/1
7.5 TABLET ORAL 2 TIMES DAILY
Qty: 180 TABLET | Refills: 1 | Status: SHIPPED | OUTPATIENT
Start: 2019-07-11 | End: 2019-07-19 | Stop reason: DRUGHIGH

## 2019-07-11 RX ORDER — AMLODIPINE BESYLATE 2.5 MG/1
2.5 TABLET ORAL 2 TIMES DAILY
Qty: 180 TABLET | Refills: 1 | Status: SHIPPED | OUTPATIENT
Start: 2019-07-11 | End: 2019-07-19 | Stop reason: DRUGHIGH

## 2019-07-19 ENCOUNTER — TELEPHONE (OUTPATIENT)
Dept: CARDIOLOGY | Age: 84
End: 2019-07-19

## 2019-07-19 RX ORDER — AMLODIPINE BESYLATE 5 MG/1
5 TABLET ORAL 2 TIMES DAILY
Qty: 90 TABLET | Refills: 3 | Status: SHIPPED | OUTPATIENT
Start: 2019-07-19 | End: 2020-04-06

## 2019-07-22 RX ORDER — METOPROLOL TARTRATE 50 MG/1
TABLET, FILM COATED ORAL
COMMUNITY
Start: 2016-07-08 | End: 2019-07-23

## 2019-07-23 ENCOUNTER — OFFICE VISIT (OUTPATIENT)
Dept: CARDIOLOGY | Age: 84
End: 2019-07-23

## 2019-07-23 VITALS
BODY MASS INDEX: 32.78 KG/M2 | SYSTOLIC BLOOD PRESSURE: 160 MMHG | DIASTOLIC BLOOD PRESSURE: 62 MMHG | WEIGHT: 192 LBS | OXYGEN SATURATION: 96 % | HEART RATE: 77 BPM | HEIGHT: 64 IN

## 2019-07-23 DIAGNOSIS — I10 ESSENTIAL HYPERTENSION: Primary | ICD-10-CM

## 2019-07-23 DIAGNOSIS — R00.1 BRADYCARDIA: ICD-10-CM

## 2019-07-23 PROCEDURE — 93000 ELECTROCARDIOGRAM COMPLETE: CPT | Performed by: NURSE PRACTITIONER

## 2019-07-23 PROCEDURE — 99214 OFFICE O/P EST MOD 30 MIN: CPT | Performed by: NURSE PRACTITIONER

## 2019-07-23 RX ORDER — HYDRALAZINE HYDROCHLORIDE 10 MG/1
10 TABLET, FILM COATED ORAL DAILY
Qty: 30 TABLET | Refills: 1 | Status: SHIPPED | OUTPATIENT
Start: 2019-07-23 | End: 2019-07-24 | Stop reason: DRUGHIGH

## 2019-07-23 RX ORDER — HYDRALAZINE HYDROCHLORIDE 10 MG/1
10 TABLET, FILM COATED ORAL DAILY
Qty: 30 TABLET | Refills: 1 | Status: SHIPPED | OUTPATIENT
Start: 2019-07-23 | End: 2019-07-23 | Stop reason: SDUPTHER

## 2019-07-23 RX ORDER — HYDRALAZINE HYDROCHLORIDE 10 MG/1
10 TABLET, FILM COATED ORAL DAILY
Qty: 30 TABLET | Refills: 1 | OUTPATIENT
Start: 2019-07-23

## 2019-07-24 ENCOUNTER — TELEPHONE (OUTPATIENT)
Dept: CARDIOLOGY | Age: 84
End: 2019-07-24

## 2019-07-24 RX ORDER — HYDRALAZINE HYDROCHLORIDE 10 MG/1
10 TABLET, FILM COATED ORAL 2 TIMES DAILY
Qty: 180 TABLET | Refills: 1 | Status: SHIPPED | OUTPATIENT
Start: 2019-07-24 | End: 2020-08-10 | Stop reason: SDUPTHER

## 2019-09-23 RX ORDER — SIMVASTATIN 20 MG
TABLET ORAL AT BEDTIME
Qty: 90 TABLET | Refills: 0 | Status: SHIPPED | OUTPATIENT
Start: 2019-09-23 | End: 2019-11-11 | Stop reason: SDUPTHER

## 2019-10-09 ENCOUNTER — OFFICE VISIT (OUTPATIENT)
Dept: CARDIOLOGY CLINIC | Facility: CLINIC | Age: 84
End: 2019-10-09
Payer: MEDICARE

## 2019-10-09 VITALS
SYSTOLIC BLOOD PRESSURE: 115 MMHG | WEIGHT: 190 LBS | BODY MASS INDEX: 32.44 KG/M2 | DIASTOLIC BLOOD PRESSURE: 65 MMHG | HEART RATE: 71 BPM | HEIGHT: 64 IN

## 2019-10-09 DIAGNOSIS — I49.1 PAC (PREMATURE ATRIAL CONTRACTION): ICD-10-CM

## 2019-10-09 DIAGNOSIS — I25.10 CORONARY ARTERIOSCLEROSIS IN NATIVE ARTERY: ICD-10-CM

## 2019-10-09 DIAGNOSIS — I10 BENIGN ESSENTIAL HTN: Primary | ICD-10-CM

## 2019-10-09 DIAGNOSIS — Z86.718 HISTORY OF DEEP VEIN THROMBOSIS: ICD-10-CM

## 2019-10-09 PROCEDURE — G0463 HOSPITAL OUTPT CLINIC VISIT: HCPCS | Performed by: INTERNAL MEDICINE

## 2019-10-09 PROCEDURE — 99214 OFFICE O/P EST MOD 30 MIN: CPT | Performed by: INTERNAL MEDICINE

## 2019-10-09 RX ORDER — HYDRALAZINE HYDROCHLORIDE 10 MG/1
TABLET, FILM COATED ORAL 2 TIMES DAILY
COMMUNITY
Start: 2019-07-24 | End: 2021-03-01

## 2019-10-09 RX ORDER — LOSARTAN POTASSIUM 100 MG/1
100 TABLET ORAL
COMMUNITY

## 2019-10-09 RX ORDER — CLOPIDOGREL BISULFATE 75 MG/1
1 TABLET ORAL
COMMUNITY
Start: 2006-05-18

## 2019-10-09 NOTE — PROGRESS NOTES
Tomas Alcala is a 80year old female. Patient presents with: Follow - Up: CAD ,hypertension ,denies chest pain but does have SOB     HPI:   Patient is here for follow-up appointment. She was seen in the [facility] office for last 3 4 years.   She i Ht 5' 4\" (1.626 m)   Wt 190 lb (86.2 kg)   BMI 32.61 kg/m²   GENERAL: well developed, well nourished,in no apparent distress  SKIN: no rashes,no suspicious lesions  HEENT: atraumatic, normocephalic,ears and throat are clear  NECK: supple,no adenopathy,no

## 2019-10-14 ENCOUNTER — TELEPHONE (OUTPATIENT)
Dept: CARDIOLOGY | Age: 84
End: 2019-10-14

## 2019-10-14 RX ORDER — CLOPIDOGREL BISULFATE 75 MG/1
75 TABLET ORAL DAILY
Qty: 90 TABLET | Refills: 2 | Status: SHIPPED | OUTPATIENT
Start: 2019-10-14

## 2019-10-14 RX ORDER — CLOPIDOGREL BISULFATE 75 MG/1
TABLET ORAL DAILY
Qty: 90 TABLET | Refills: 2 | Status: SHIPPED | OUTPATIENT
Start: 2019-10-14 | End: 2021-01-25

## 2019-11-11 RX ORDER — SIMVASTATIN 20 MG
TABLET ORAL AT BEDTIME
Qty: 90 TABLET | Refills: 1 | Status: SHIPPED | OUTPATIENT
Start: 2019-11-11 | End: 2020-11-06 | Stop reason: SDUPTHER

## 2020-02-07 ENCOUNTER — LAB ENCOUNTER (OUTPATIENT)
Dept: LAB | Age: 85
End: 2020-02-07
Attending: FAMILY MEDICINE
Payer: MEDICARE

## 2020-02-07 DIAGNOSIS — R53.83 FATIGUE: ICD-10-CM

## 2020-02-07 DIAGNOSIS — E78.00 HYPERCHOLESTEROLEMIA: Primary | ICD-10-CM

## 2020-02-07 DIAGNOSIS — E04.9 ENLARGEMENT OF THYROID: ICD-10-CM

## 2020-02-07 LAB
ABSOLUTE IMMATURE GRANULOCYTES (OFFPRE24): NORMAL
ALBUMIN SERPL-MCNC: 3.9 G/DL
ALBUMIN SERPL-MCNC: 3.9 G/DL (ref 3.4–5)
ALBUMIN/GLOB SERPL: 1.1 {RATIO} (ref 1–2)
ALBUMIN/GLOB SERPL: NORMAL {RATIO}
ALP LIVER SERPL-CCNC: 45 U/L (ref 55–142)
ALP SERPL-CCNC: 45 U/L
ALT SERPL-CCNC: 18 U/L
ALT SERPL-CCNC: 18 U/L (ref 13–56)
ANION GAP SERPL CALC-SCNC: 4 MMOL/L (ref 0–18)
ANION GAP SERPL CALC-SCNC: NORMAL MMOL/L
AST SERPL-CCNC: 17 U/L
AST SERPL-CCNC: 17 U/L (ref 15–37)
BASO+EOS+MONOS # BLD: NORMAL 10*3/UL
BASO+EOS+MONOS NFR BLD: NORMAL %
BASOPHILS # BLD AUTO: 0.06 X10(3) UL (ref 0–0.2)
BASOPHILS # BLD: NORMAL 10*3/UL
BASOPHILS NFR BLD AUTO: 0.8 %
BASOPHILS NFR BLD: NORMAL %
BILIRUB SERPL-MCNC: 0.5 MG/DL
BILIRUB SERPL-MCNC: 0.5 MG/DL (ref 0.1–2)
BUN BLD-MCNC: 30 MG/DL (ref 7–18)
BUN SERPL-MCNC: 30 MG/DL
BUN/CREAT SERPL: 21.6 (ref 10–20)
BUN/CREAT SERPL: NORMAL
CALCIUM BLD-MCNC: 10.4 MG/DL (ref 8.5–10.1)
CALCIUM SERPL-MCNC: 10.4 MG/DL
CHLORIDE SERPL-SCNC: 111 MMOL/L
CHLORIDE SERPL-SCNC: 111 MMOL/L (ref 98–112)
CHOLEST SERPL-MCNC: 176 MG/DL
CHOLEST SMN-MCNC: 176 MG/DL (ref ?–200)
CHOLEST/HDLC SERPL: NORMAL {RATIO}
CO2 SERPL-SCNC: 26 MMOL/L (ref 21–32)
CO2 SERPL-SCNC: NORMAL MMOL/L
CREAT BLD-MCNC: 1.39 MG/DL (ref 0.55–1.02)
CREAT SERPL-MCNC: 1.39 MG/DL
DEPRECATED RDW RBC AUTO: 51 FL (ref 35.1–46.3)
DIFFERENTIAL METHOD BLD: NORMAL
EOSINOPHIL # BLD AUTO: 0.11 X10(3) UL (ref 0–0.7)
EOSINOPHIL # BLD: NORMAL 10*3/UL
EOSINOPHIL NFR BLD AUTO: 1.4 %
EOSINOPHIL NFR BLD: NORMAL %
ERYTHROCYTE [DISTWIDTH] IN BLOOD BY AUTOMATED COUNT: 13.4 % (ref 11–15)
ERYTHROCYTE [DISTWIDTH] IN BLOOD: NORMAL %
GLOBULIN PLAS-MCNC: 3.6 G/DL (ref 2.8–4.4)
GLOBULIN SER-MCNC: 3.6 G/DL
GLUCOSE BLD-MCNC: 104 MG/DL (ref 70–99)
GLUCOSE SERPL-MCNC: 104 MG/DL
HCT VFR BLD AUTO: 42.1 % (ref 35–48)
HCT VFR BLD CALC: 42.1 %
HDLC SERPL-MCNC: 64 MG/DL
HDLC SERPL-MCNC: 64 MG/DL (ref 40–59)
HGB BLD-MCNC: 13.4 G/DL
HGB BLD-MCNC: 13.4 G/DL (ref 12–16)
IMM GRANULOCYTES # BLD AUTO: 0.02 X10(3) UL (ref 0–1)
IMM GRANULOCYTES NFR BLD: 0.3 %
IMMATURE GRANULOCYTES (OFFPRE25): NORMAL
LDLC SERPL CALC-MCNC: 86 MG/DL
LDLC SERPL CALC-MCNC: 86 MG/DL (ref ?–100)
LENGTH OF FAST TIME PATIENT: NORMAL H
LENGTH OF FAST TIME PATIENT: YES H
LYMPHOCYTES # BLD AUTO: 1.1 X10(3) UL (ref 1–4)
LYMPHOCYTES # BLD: NORMAL 10*3/UL
LYMPHOCYTES NFR BLD AUTO: 14 %
LYMPHOCYTES NFR BLD: NORMAL %
M PROTEIN MFR SERPL ELPH: 7.5 G/DL (ref 6.4–8.2)
MCH RBC QN AUTO: 32.5 PG
MCH RBC QN AUTO: 32.5 PG (ref 26–34)
MCHC RBC AUTO-ENTMCNC: 31.8 G/DL
MCHC RBC AUTO-ENTMCNC: 31.8 G/DL (ref 31–37)
MCV RBC AUTO: 102.2 FL
MCV RBC AUTO: 102.2 FL (ref 80–100)
MONOCYTES # BLD AUTO: 0.4 X10(3) UL (ref 0.1–1)
MONOCYTES # BLD: NORMAL 10*3/UL
MONOCYTES NFR BLD AUTO: 5.1 %
MONOCYTES NFR BLD: NORMAL %
MPV (OFFPRE2): NORMAL
NEUTROPHILS # BLD AUTO: 6.17 X10 (3) UL (ref 1.5–7.7)
NEUTROPHILS # BLD AUTO: 6.17 X10(3) UL (ref 1.5–7.7)
NEUTROPHILS # BLD: NORMAL 10*3/UL
NEUTROPHILS NFR BLD AUTO: 78.4 %
NEUTROPHILS NFR BLD: NORMAL %
NONHDLC SERPL-MCNC: 112 MG/DL
NONHDLC SERPL-MCNC: 112 MG/DL (ref ?–130)
NRBC BLD MANUAL-RTO: NORMAL %
OSMOLALITY SERPL CALC.SUM OF ELEC: 298 MOSM/KG (ref 275–295)
PATIENT FASTING Y/N/NP: YES
PATIENT FASTING Y/N/NP: YES
PLAT MORPH BLD: NORMAL
PLATELET # BLD AUTO: 303 10(3)UL (ref 150–450)
PLATELET # BLD: 303 10*3/UL
POTASSIUM SERPL-SCNC: 4.3 MMOL/L
POTASSIUM SERPL-SCNC: 4.3 MMOL/L (ref 3.5–5.1)
PROT SERPL-MCNC: 7.5 G/DL
RBC # BLD AUTO: 4.12 X10(6)UL (ref 3.8–5.3)
RBC # BLD: 4.12 10*6/UL
RBC MORPH BLD: NORMAL
SODIUM SERPL-SCNC: 141 MMOL/L
SODIUM SERPL-SCNC: 141 MMOL/L (ref 136–145)
TRIGL SERPL-MCNC: 131 MG/DL
TRIGL SERPL-MCNC: 131 MG/DL (ref 30–149)
TSH SERPL-ACNC: 1.56 M[IU]/L
TSI SER-ACNC: 1.56 MIU/ML (ref 0.36–3.74)
VLDLC SERPL CALC-MCNC: 26 MG/DL (ref 0–30)
VLDLC SERPL CALC-MCNC: NORMAL MG/DL
WBC # BLD AUTO: 7.9 X10(3) UL (ref 4–11)
WBC # BLD: 7.9 10*3/UL
WBC MORPH BLD: NORMAL

## 2020-02-07 PROCEDURE — 80053 COMPREHEN METABOLIC PANEL: CPT

## 2020-02-07 PROCEDURE — 80061 LIPID PANEL: CPT

## 2020-02-07 PROCEDURE — 84443 ASSAY THYROID STIM HORMONE: CPT

## 2020-02-07 PROCEDURE — 85025 COMPLETE CBC W/AUTO DIFF WBC: CPT

## 2020-02-07 PROCEDURE — 36415 COLL VENOUS BLD VENIPUNCTURE: CPT

## 2020-03-12 ENCOUNTER — TELEPHONE (OUTPATIENT)
Dept: CARDIOLOGY CLINIC | Facility: CLINIC | Age: 85
End: 2020-03-12

## 2020-03-12 NOTE — TELEPHONE ENCOUNTER
Spoke with patient. Verified name and . Patient states she woke up 2 nights ago with a slight chest pain by her left breast center of chest and had slight numbing feeling in her hand at the time. Patient denied slurring of speech, denies fever, no shortness of breath, no chest tightness. Patient states she attends physical therapy without chest pain. Patient denies chest pain at this time. Patient has an appointment in April with Dr. Minerva Haley but would like to be seen by someone prior. Appointment scheduled for tomorrow,3/13/20 at 2 pm with Angie MACEDO.

## 2020-03-12 NOTE — TELEPHONE ENCOUNTER
Patient indicates she has chest pain at night while sleeping, does not have chest pain now. Will try nurse line, if not please call, thanks.

## 2020-03-13 ENCOUNTER — OFFICE VISIT (OUTPATIENT)
Dept: CARDIOLOGY CLINIC | Facility: CLINIC | Age: 85
End: 2020-03-13
Payer: MEDICARE

## 2020-03-13 VITALS
BODY MASS INDEX: 31.69 KG/M2 | SYSTOLIC BLOOD PRESSURE: 143 MMHG | DIASTOLIC BLOOD PRESSURE: 86 MMHG | HEIGHT: 64 IN | HEART RATE: 80 BPM | WEIGHT: 185.63 LBS | RESPIRATION RATE: 18 BRPM

## 2020-03-13 DIAGNOSIS — R07.9 CHEST PAIN, UNSPECIFIED TYPE: Primary | ICD-10-CM

## 2020-03-13 PROCEDURE — G0463 HOSPITAL OUTPT CLINIC VISIT: HCPCS | Performed by: NURSE PRACTITIONER

## 2020-03-13 PROCEDURE — 93005 ELECTROCARDIOGRAM TRACING: CPT | Performed by: NURSE PRACTITIONER

## 2020-03-13 PROCEDURE — 93010 ELECTROCARDIOGRAM REPORT: CPT | Performed by: NURSE PRACTITIONER

## 2020-03-13 PROCEDURE — 99214 OFFICE O/P EST MOD 30 MIN: CPT | Performed by: NURSE PRACTITIONER

## 2020-03-13 NOTE — PROGRESS NOTES
Cady Cruz is a 80year old female. Patient presents with: Follow - Up  Chest Pain: pressure  Dizziness  Shortness Of Breath    HPI:   Patient comes in today for checkup. She sees Dr. Laure Santos she has a history of coronary disease hypertension. Never Used    Alcohol use:  Yes      Alcohol/week: 0.0 standard drinks      Comment: 1 drink weekly    Drug use: Not on file       REVIEW OF SYSTEMS:   GENERAL HEALTH: feels well otherwise  SKIN: denies any unusual skin lesions or rashes  RESPIRATORY: denie

## 2020-03-20 ENCOUNTER — TELEPHONE (OUTPATIENT)
Dept: CARDIOLOGY CLINIC | Facility: CLINIC | Age: 85
End: 2020-03-20

## 2020-03-20 NOTE — TELEPHONE ENCOUNTER
Pt rescheduled appt to 4/77/11 due to public health concern. Pt currently has appt scheduled for stress test on 3/27/20 and inquiring if she should hold off on stress test at this time.  Pt inquiring if she should complete stress test closer to the time of

## 2020-03-20 NOTE — TELEPHONE ENCOUNTER
S/w carrillo and relayed that this is being managed by the [address] [place] [address] cardio dept , but that yes her stress test will be postponed as we did the evaluation for that. She will reschedule to be for her f/u.

## 2020-04-06 RX ORDER — HYDRALAZINE HYDROCHLORIDE 10 MG/1
TABLET, FILM COATED ORAL
Qty: 180 TABLET | Refills: 1 | Status: SHIPPED | OUTPATIENT
Start: 2020-04-06 | End: 2020-08-10

## 2020-04-06 RX ORDER — AMLODIPINE BESYLATE 5 MG/1
TABLET ORAL
Qty: 180 TABLET | Refills: 1 | Status: SHIPPED | OUTPATIENT
Start: 2020-04-06 | End: 2020-08-10

## 2020-06-05 ENCOUNTER — TELEPHONE (OUTPATIENT)
Dept: CARDIOLOGY | Age: 85
End: 2020-06-05

## 2020-06-05 RX ORDER — SIMVASTATIN 20 MG
TABLET ORAL AT BEDTIME
Qty: 90 TABLET | Refills: 1 | OUTPATIENT
Start: 2020-06-05

## 2020-06-05 RX ORDER — SIMVASTATIN 20 MG
20 TABLET ORAL NIGHTLY
Qty: 90 TABLET | Refills: 1 | Status: SHIPPED | OUTPATIENT
Start: 2020-06-05 | End: 2020-12-21

## 2020-06-29 RX ORDER — LOSARTAN POTASSIUM 100 MG/1
TABLET ORAL
Qty: 180 TABLET | Refills: 0 | Status: SHIPPED | OUTPATIENT
Start: 2020-06-29 | End: 2020-08-18

## 2020-08-10 RX ORDER — HYDRALAZINE HYDROCHLORIDE 10 MG/1
10 TABLET, FILM COATED ORAL 2 TIMES DAILY
Qty: 180 TABLET | Refills: 0 | Status: SHIPPED | OUTPATIENT
Start: 2020-08-10 | End: 2020-10-19

## 2020-08-10 RX ORDER — AMLODIPINE BESYLATE 5 MG/1
5 TABLET ORAL 2 TIMES DAILY
Qty: 180 TABLET | Refills: 0 | Status: SHIPPED | OUTPATIENT
Start: 2020-08-10 | End: 2020-10-19

## 2020-08-11 ENCOUNTER — TELEPHONE (OUTPATIENT)
Dept: CARDIOLOGY | Age: 85
End: 2020-08-11

## 2020-08-12 ENCOUNTER — TELEPHONE (OUTPATIENT)
Dept: CARDIOLOGY | Age: 85
End: 2020-08-12

## 2020-08-18 RX ORDER — LOSARTAN POTASSIUM 100 MG/1
100 TABLET ORAL 2 TIMES DAILY
Qty: 180 TABLET | Refills: 0 | Status: SHIPPED | OUTPATIENT
Start: 2020-08-18 | End: 2020-10-19

## 2020-09-26 ENCOUNTER — OFFICE VISIT (OUTPATIENT)
Dept: DERMATOLOGY CLINIC | Facility: CLINIC | Age: 85
End: 2020-09-26
Payer: MEDICARE

## 2020-09-26 DIAGNOSIS — L30.9 DERMATITIS: ICD-10-CM

## 2020-09-26 DIAGNOSIS — B07.9 VIRAL WARTS, UNSPECIFIED TYPE: ICD-10-CM

## 2020-09-26 DIAGNOSIS — D23.5 BENIGN NEOPLASM OF SKIN OF TRUNK, EXCEPT SCROTUM: ICD-10-CM

## 2020-09-26 DIAGNOSIS — D23.4 BENIGN NEOPLASM OF SCALP AND SKIN OF NECK: ICD-10-CM

## 2020-09-26 DIAGNOSIS — D23.60 BENIGN NEOPLASM OF SKIN OF UPPER LIMB, INCLUDING SHOULDER, UNSPECIFIED LATERALITY: ICD-10-CM

## 2020-09-26 DIAGNOSIS — D23.30 BENIGN NEOPLASM OF SKIN OF FACE: ICD-10-CM

## 2020-09-26 DIAGNOSIS — L57.0 AK (ACTINIC KERATOSIS): ICD-10-CM

## 2020-09-26 DIAGNOSIS — L82.0 INFLAMED SEBORRHEIC KERATOSIS: ICD-10-CM

## 2020-09-26 DIAGNOSIS — L82.1 SEBORRHEIC KERATOSES: ICD-10-CM

## 2020-09-26 DIAGNOSIS — Z85.820 PERSONAL HISTORY OF MALIGNANT MELANOMA OF SKIN: ICD-10-CM

## 2020-09-26 DIAGNOSIS — Z85.828 HISTORY OF SKIN CANCER: ICD-10-CM

## 2020-09-26 DIAGNOSIS — D48.5 NEOPLASM OF UNCERTAIN BEHAVIOR OF SKIN: Primary | ICD-10-CM

## 2020-09-26 PROCEDURE — G0463 HOSPITAL OUTPT CLINIC VISIT: HCPCS | Performed by: DERMATOLOGY

## 2020-09-26 PROCEDURE — 99213 OFFICE O/P EST LOW 20 MIN: CPT | Performed by: DERMATOLOGY

## 2020-09-26 PROCEDURE — 88305 TISSUE EXAM BY PATHOLOGIST: CPT | Performed by: DERMATOLOGY

## 2020-09-29 NOTE — PROGRESS NOTES
The pathology report from last visit showed  left upper arm  -Intradermal nevus  . Please log in test results, send biopsy results letter. Pt to rtc next summer or prn.

## 2020-10-04 NOTE — PROGRESS NOTES
Operative Report                     Shave/  Tangential biopsy     Clinical diagnosis:    Size of lesion:    Location:Diagnosis/Clinical History: pt with hx skin cancers tender new growing lesion  Spec 1 Description >>>>>: left upper arm  Spec 1 Comm

## 2020-10-04 NOTE — PROGRESS NOTES
Jese Preciado is a 80year old female. HPI:     CC:  Patient presents with:  Full Skin Exam: established pt - presents for 18 mo skin exam, personal hx of melanoma and BCC - pt c/o many new \"rough\" spots to back.  Also, please check wart-like le COMMENTS)    Comment:Other reaction(s): Pruritis             Other reaction(s): Pruritis  Levofloxacin            OTHER (SEE COMMENTS)    Comment:Just remembers she never wants to take the             medication ever again.              Other reaction(s): P service: Not on file        Active member of club or organization: Not on file        Attends meetings of clubs or organizations: Not on file        Relationship status: Not on file      Intimate partner violence        Fear of current or ex partner: Not o Well-developed well-nourished patient alert oriented in no acute distress.   Exam total-body performed, including scalp, head, neck, face,nails, hair, external eyes, including conjunctival mucosa, eyelids, lips external ears, back, chest,/ breasts, axillae, line superiorly flesh-colored f 4 mm papules noted.   Per patient has been stable in size eye doctor is monitoring discussed likely benign-appearing nevus recommend observation if this does change follow-up with ophthalmology, oculoplastics for removal. software. Please contact me regarding any confusion resulting from errors in recognition.

## 2020-10-19 ENCOUNTER — TELEPHONE (OUTPATIENT)
Dept: CARDIOLOGY | Age: 85
End: 2020-10-19

## 2020-10-19 RX ORDER — HYDRALAZINE HYDROCHLORIDE 10 MG/1
TABLET, FILM COATED ORAL
Qty: 180 TABLET | Refills: 0 | Status: SHIPPED | OUTPATIENT
Start: 2020-10-19 | End: 2020-11-06 | Stop reason: SDUPTHER

## 2020-10-19 RX ORDER — AMLODIPINE BESYLATE 5 MG/1
TABLET ORAL
Qty: 180 TABLET | Refills: 0 | Status: SHIPPED | OUTPATIENT
Start: 2020-10-19 | End: 2020-11-06 | Stop reason: SDUPTHER

## 2020-10-19 RX ORDER — HYDRALAZINE HYDROCHLORIDE 10 MG/1
10 TABLET, FILM COATED ORAL 3 TIMES DAILY
Status: CANCELLED | OUTPATIENT
Start: 2020-10-19

## 2020-10-19 RX ORDER — AMLODIPINE BESYLATE 5 MG/1
5 TABLET ORAL 2 TIMES DAILY
Qty: 180 TABLET | Refills: 0 | Status: CANCELLED | OUTPATIENT
Start: 2020-10-19

## 2020-10-19 RX ORDER — LOSARTAN POTASSIUM 100 MG/1
TABLET ORAL
Qty: 180 TABLET | Refills: 0 | Status: SHIPPED | OUTPATIENT
Start: 2020-10-19

## 2020-11-06 ENCOUNTER — OFF PREMISE (OUTPATIENT)
Dept: CARDIOLOGY | Age: 85
End: 2020-11-06

## 2020-11-06 VITALS
HEIGHT: 64 IN | BODY MASS INDEX: 32.1 KG/M2 | DIASTOLIC BLOOD PRESSURE: 70 MMHG | OXYGEN SATURATION: 95 % | WEIGHT: 188 LBS | SYSTOLIC BLOOD PRESSURE: 160 MMHG | HEART RATE: 78 BPM

## 2020-11-06 DIAGNOSIS — E78.5 DYSLIPIDEMIA: ICD-10-CM

## 2020-11-06 DIAGNOSIS — I10 ESSENTIAL HYPERTENSION: ICD-10-CM

## 2020-11-06 DIAGNOSIS — I25.10 CORONARY ARTERY DISEASE INVOLVING NATIVE CORONARY ARTERY OF NATIVE HEART WITHOUT ANGINA PECTORIS: Primary | ICD-10-CM

## 2020-11-06 PROCEDURE — 99214 OFFICE O/P EST MOD 30 MIN: CPT | Performed by: INTERNAL MEDICINE

## 2020-11-06 RX ORDER — HYDRALAZINE HYDROCHLORIDE 25 MG/1
25 TABLET, FILM COATED ORAL 2 TIMES DAILY
Qty: 60 TABLET | Refills: 11 | Status: SHIPPED | OUTPATIENT
Start: 2020-11-06

## 2020-11-06 RX ORDER — AMLODIPINE BESYLATE 5 MG/1
5 TABLET ORAL DAILY
Qty: 180 TABLET | Refills: 11 | Status: SHIPPED | OUTPATIENT
Start: 2020-11-06

## 2020-11-13 DIAGNOSIS — N30.20: Primary | ICD-10-CM

## 2020-12-21 RX ORDER — SIMVASTATIN 20 MG
TABLET ORAL
Qty: 90 TABLET | Refills: 0 | Status: SHIPPED | OUTPATIENT
Start: 2020-12-21 | End: 2021-04-27

## 2021-01-23 DIAGNOSIS — I25.10 CORONARY ARTERY DISEASE INVOLVING NATIVE CORONARY ARTERY OF NATIVE HEART WITHOUT ANGINA PECTORIS: Primary | ICD-10-CM

## 2021-01-26 RX ORDER — CLOPIDOGREL BISULFATE 75 MG/1
TABLET ORAL DAILY
Qty: 90 TABLET | Refills: 0 | Status: SHIPPED | OUTPATIENT
Start: 2021-01-26 | End: 2021-03-30

## 2021-02-01 DIAGNOSIS — Z23 NEED FOR VACCINATION: ICD-10-CM

## 2021-02-26 ENCOUNTER — TELEPHONE (OUTPATIENT)
Dept: CARDIOLOGY | Age: 86
End: 2021-02-26

## 2021-03-01 ENCOUNTER — OFFICE VISIT (OUTPATIENT)
Dept: CARDIOLOGY CLINIC | Facility: CLINIC | Age: 86
End: 2021-03-01
Payer: MEDICARE

## 2021-03-01 VITALS
HEART RATE: 76 BPM | HEIGHT: 64 IN | WEIGHT: 183 LBS | BODY MASS INDEX: 31.24 KG/M2 | OXYGEN SATURATION: 96 % | SYSTOLIC BLOOD PRESSURE: 165 MMHG | DIASTOLIC BLOOD PRESSURE: 69 MMHG | RESPIRATION RATE: 18 BRPM

## 2021-03-01 DIAGNOSIS — I25.10 CORONARY ARTERIOSCLEROSIS IN NATIVE ARTERY: ICD-10-CM

## 2021-03-01 DIAGNOSIS — R06.00 DYSPNEA, UNSPECIFIED TYPE: ICD-10-CM

## 2021-03-01 DIAGNOSIS — R42 DIZZINESS: Primary | ICD-10-CM

## 2021-03-01 PROCEDURE — 99214 OFFICE O/P EST MOD 30 MIN: CPT | Performed by: NURSE PRACTITIONER

## 2021-03-01 RX ORDER — HYDRALAZINE HYDROCHLORIDE 25 MG/1
25 TABLET, FILM COATED ORAL 2 TIMES DAILY
COMMUNITY
Start: 2020-11-06 | End: 2021-03-01

## 2021-03-01 RX ORDER — PYRIDOXINE HCL (VITAMIN B6) 50 MG
TABLET ORAL
COMMUNITY

## 2021-03-01 RX ORDER — HYDRALAZINE HYDROCHLORIDE 10 MG/1
10 TABLET, FILM COATED ORAL 2 TIMES DAILY
Qty: 180 TABLET | Refills: 1 | Status: SHIPPED | OUTPATIENT
Start: 2021-03-01 | End: 2021-12-01

## 2021-03-01 RX ORDER — ACETAMINOPHEN 500 MG
1000 TABLET ORAL 3 TIMES DAILY
COMMUNITY

## 2021-03-01 NOTE — PROGRESS NOTES
Cady Cruz is a 80year old female. Patient presents with: Follow - Up  Hypertension  Dizziness  Fatigue: weakness  Dyspnea: on exertion    HPI:   Patient comes in today for follow-up.  She sees Dr. Christofer Brock she is seeing me in this office and  Date   • Basal cell carcinoma 2014    per ng rt   • Bilateral cataracts 2010    per ng surgery   • Hyperthyroidism 2010    per ng redioactive iodine therapy   • Melanoma ([facility] [address] 2013    right shoulder 0.42mm   • Myocardial infarction ([facility] [address] 2015    Replaced a walking. Her blood pressure is borderline high I like to switch her back to her original amlodipine 10 mg and hydralazine 10 mg twice a day. I like to do a carotid ultrasound with her dizziness and her known coronary disease.   We will also do a stress te

## 2021-03-01 NOTE — PATIENT INSTRUCTIONS
1. Increase amlodipine 10mg daily  2. Decrease hydralazine 10mg twice a day  3.  Stress test and carotid and bloodwork

## 2021-03-02 ENCOUNTER — LAB ENCOUNTER (OUTPATIENT)
Dept: LAB | Age: 86
End: 2021-03-02
Attending: NURSE PRACTITIONER
Payer: MEDICARE

## 2021-03-02 DIAGNOSIS — R42 DIZZINESS: ICD-10-CM

## 2021-03-02 DIAGNOSIS — R06.00 DYSPNEA, UNSPECIFIED TYPE: ICD-10-CM

## 2021-03-02 DIAGNOSIS — I25.10 CORONARY ARTERIOSCLEROSIS IN NATIVE ARTERY: ICD-10-CM

## 2021-03-02 LAB
ALBUMIN SERPL-MCNC: 3.9 G/DL (ref 3.4–5)
ALBUMIN/GLOB SERPL: 1.2 {RATIO} (ref 1–2)
ALP LIVER SERPL-CCNC: 43 U/L
ALT SERPL-CCNC: 20 U/L
ANION GAP SERPL CALC-SCNC: 5 MMOL/L (ref 0–18)
AST SERPL-CCNC: 13 U/L (ref 15–37)
BILIRUB SERPL-MCNC: 0.6 MG/DL (ref 0.1–2)
BUN BLD-MCNC: 31 MG/DL (ref 7–18)
BUN/CREAT SERPL: 21.7 (ref 10–20)
CALCIUM BLD-MCNC: 9.9 MG/DL (ref 8.5–10.1)
CHLORIDE SERPL-SCNC: 110 MMOL/L (ref 98–112)
CHOLEST SMN-MCNC: 147 MG/DL (ref ?–200)
CO2 SERPL-SCNC: 28 MMOL/L (ref 21–32)
CREAT BLD-MCNC: 1.43 MG/DL
DEPRECATED RDW RBC AUTO: 53.1 FL (ref 35.1–46.3)
ERYTHROCYTE [DISTWIDTH] IN BLOOD BY AUTOMATED COUNT: 13.6 % (ref 11–15)
ERYTHROCYTE [DISTWIDTH] IN BLOOD BY AUTOMATED COUNT: 53.1 %
ERYTHROCYTE [DISTWIDTH] IN BLOOD: 13.6 %
GLOBULIN PLAS-MCNC: 3.3 G/DL (ref 2.8–4.4)
GLUCOSE BLD-MCNC: 100 MG/DL (ref 70–99)
HCT VFR BLD AUTO: 39.6 %
HCT VFR BLD CALC: 39.6 %
HDLC SERPL-MCNC: 64 MG/DL (ref 40–59)
HGB BLD-MCNC: 12.7 G/DL
HGB BLD-MCNC: 12.7 G/DL
LDLC SERPL CALC-MCNC: 63 MG/DL (ref ?–100)
M PROTEIN MFR SERPL ELPH: 7.2 G/DL (ref 6.4–8.2)
MCH RBC QN AUTO: 33.8 PG
MCH RBC QN AUTO: 33.8 PG (ref 26–34)
MCHC RBC AUTO-ENTMCNC: 32.1 G/DL
MCHC RBC AUTO-ENTMCNC: 32.1 G/DL (ref 31–37)
MCV RBC AUTO: 105.3 FL
MCV RBC AUTO: 105.3 FL
NONHDLC SERPL-MCNC: 83 MG/DL (ref ?–130)
OSMOLALITY SERPL CALC.SUM OF ELEC: 303 MOSM/KG (ref 275–295)
PATIENT FASTING Y/N/NP: YES
PATIENT FASTING Y/N/NP: YES
PLATELET # BLD AUTO: 313 10(3)UL (ref 150–450)
PLATELET # BLD: 313 K/MCL
POTASSIUM SERPL-SCNC: 4.3 MMOL/L (ref 3.5–5.1)
RBC # BLD AUTO: 3.76 X10(6)UL
RBC # BLD: 3.76 10*6/UL
SODIUM SERPL-SCNC: 143 MMOL/L (ref 136–145)
TRIGL SERPL-MCNC: 101 MG/DL (ref 30–149)
TSI SER-ACNC: 1.74 MIU/ML (ref 0.36–3.74)
VLDLC SERPL CALC-MCNC: 20 MG/DL (ref 0–30)
WBC # BLD AUTO: 6.9 X10(3) UL (ref 4–11)
WBC # BLD: 6.9 K/MCL

## 2021-03-02 PROCEDURE — 84443 ASSAY THYROID STIM HORMONE: CPT

## 2021-03-02 PROCEDURE — 80053 COMPREHEN METABOLIC PANEL: CPT

## 2021-03-02 PROCEDURE — 80061 LIPID PANEL: CPT

## 2021-03-02 PROCEDURE — 85027 COMPLETE CBC AUTOMATED: CPT

## 2021-03-02 PROCEDURE — 36415 COLL VENOUS BLD VENIPUNCTURE: CPT

## 2021-03-03 ENCOUNTER — TELEPHONE (OUTPATIENT)
Dept: CARDIOLOGY CLINIC | Facility: CLINIC | Age: 86
End: 2021-03-03

## 2021-03-03 DIAGNOSIS — I25.10 CORONARY ARTERIOSCLEROSIS IN NATIVE ARTERY: Primary | ICD-10-CM

## 2021-03-03 NOTE — TELEPHONE ENCOUNTER
Spoke with Leann Bo, reviewed her medications from last visit, had question about norvasc dosing,norvasc increased to 10mg per [facility] 2 days ago, also reviewed labs below. Orders for lipids entered for next year.       HELLEN Long [facility]

## 2021-03-08 ENCOUNTER — TELEPHONE (OUTPATIENT)
Dept: CARDIOLOGY CLINIC | Facility: CLINIC | Age: 86
End: 2021-03-08

## 2021-03-08 NOTE — TELEPHONE ENCOUNTER
S/w carrillo and she can take he medications. Confirmed type of clothing to weear and her support hose. No further questions.

## 2021-03-10 ENCOUNTER — HOSPITAL ENCOUNTER (OUTPATIENT)
Dept: CV DIAGNOSTICS | Facility: HOSPITAL | Age: 86
Discharge: HOME | End: 2021-03-10
Attending: NURSE PRACTITIONER
Payer: MEDICARE

## 2021-03-10 ENCOUNTER — HOSPITAL ENCOUNTER (OUTPATIENT)
Dept: NUCLEAR MEDICINE | Facility: HOSPITAL | Age: 86
Discharge: HOME | End: 2021-03-10
Attending: NURSE PRACTITIONER
Payer: MEDICARE

## 2021-03-10 ENCOUNTER — HOSPITAL ENCOUNTER (OUTPATIENT)
Dept: ULTRASOUND IMAGING | Facility: HOSPITAL | Age: 86
Discharge: HOME | End: 2021-03-10
Attending: NURSE PRACTITIONER
Payer: MEDICARE

## 2021-03-10 DIAGNOSIS — R42 DIZZINESS: ICD-10-CM

## 2021-03-10 DIAGNOSIS — R07.9 CHEST PAIN, UNSPECIFIED TYPE: ICD-10-CM

## 2021-03-10 PROCEDURE — 93018 CV STRESS TEST I&R ONLY: CPT | Performed by: NURSE PRACTITIONER

## 2021-03-10 PROCEDURE — 78452 HT MUSCLE IMAGE SPECT MULT: CPT | Performed by: NURSE PRACTITIONER

## 2021-03-10 PROCEDURE — 93016 CV STRESS TEST SUPVJ ONLY: CPT | Performed by: NURSE PRACTITIONER

## 2021-03-10 PROCEDURE — 93017 CV STRESS TEST TRACING ONLY: CPT | Performed by: NURSE PRACTITIONER

## 2021-03-10 PROCEDURE — 93880 EXTRACRANIAL BILAT STUDY: CPT | Performed by: NURSE PRACTITIONER

## 2021-03-11 ENCOUNTER — TELEPHONE (OUTPATIENT)
Dept: CARDIOLOGY CLINIC | Facility: CLINIC | Age: 86
End: 2021-03-11

## 2021-03-11 NOTE — TELEPHONE ENCOUNTER
Spoke with Brian Beatty, reviewed stress test results with her, questions answered. HELLEN Martínez [facility] Clinical Staff   Stress test was normal, continue present management.

## 2021-03-11 NOTE — TELEPHONE ENCOUNTER
please review thyroid findings      CONCLUSION:   1. Bilateral carotid bifurcation/proximal ICA plaque without hemodynamic significance.     2.  Antegrade flow within the right vertebral artery.  Nonvisualized left vertebral artery represents an

## 2021-03-12 NOTE — TELEPHONE ENCOUNTER
Reviewed carotid US with Ricky Min, he advised it might be congenital, no further testing. Reviewed results with Kg Robles, she verbalizes understanding. She will follow up with primary about thyroid nodule. Results routed to PCP on 3/11/21     Sarai Martínez

## 2021-03-30 RX ORDER — CLOPIDOGREL BISULFATE 75 MG/1
TABLET ORAL DAILY
Qty: 90 TABLET | Refills: 2 | Status: SHIPPED | OUTPATIENT
Start: 2021-03-30

## 2021-04-27 ENCOUNTER — TELEPHONE (OUTPATIENT)
Dept: CARDIOLOGY | Age: 86
End: 2021-04-27

## 2021-04-27 DIAGNOSIS — I10 ESSENTIAL HYPERTENSION: ICD-10-CM

## 2021-04-27 DIAGNOSIS — I25.10 CORONARY ARTERY DISEASE INVOLVING NATIVE CORONARY ARTERY OF NATIVE HEART WITHOUT ANGINA PECTORIS: Primary | ICD-10-CM

## 2021-04-27 DIAGNOSIS — E78.5 DYSLIPIDEMIA: ICD-10-CM

## 2021-04-27 RX ORDER — SIMVASTATIN 20 MG
TABLET ORAL
Qty: 90 TABLET | Refills: 0 | Status: SHIPPED | OUTPATIENT
Start: 2021-04-27 | End: 2021-06-28

## 2021-05-13 ENCOUNTER — TELEPHONE (OUTPATIENT)
Dept: CARDIOLOGY CLINIC | Facility: CLINIC | Age: 86
End: 2021-05-13

## 2021-05-13 RX ORDER — AMLODIPINE BESYLATE 10 MG/1
10 TABLET ORAL DAILY
Qty: 90 TABLET | Refills: 1 | Status: SHIPPED | OUTPATIENT
Start: 2021-05-13 | End: 2021-05-14 | Stop reason: CLARIF

## 2021-05-13 NOTE — TELEPHONE ENCOUNTER
Pt called in stating that she is suppose to get a refill through [facility] for medication AmLODIPine Besylate 5 MG Oral Tab.  Please follow up

## 2021-05-13 NOTE — TELEPHONE ENCOUNTER
Dose verified, creatinine elevated but within 30% range . Passes protocol. Per - 3/1/2021: Instructions    1. Increase amlodipine 10mg daily  2. Decrease hydralazine 10mg twice a day  3.  Stress test and carotid and bloodwork          After Visit Sum

## 2021-05-14 RX ORDER — AMLODIPINE BESYLATE 5 MG/1
5 TABLET ORAL 2 TIMES DAILY
Qty: 180 TABLET | Refills: 1 | Status: SHIPPED | OUTPATIENT
Start: 2021-05-14 | End: 2021-09-28

## 2021-05-14 NOTE — TELEPHONE ENCOUNTER
Pt called in to request that medication refill is sent to pharmacy [address] [place] [address], [address] [place] [address] [phone], [phone], Pt also states she only takes 5 mg twice a day Please follow up

## 2021-06-28 RX ORDER — SIMVASTATIN 20 MG
TABLET ORAL
Qty: 90 TABLET | Refills: 0 | Status: SHIPPED | OUTPATIENT
Start: 2021-06-28

## 2021-07-02 ENCOUNTER — OFFICE VISIT (OUTPATIENT)
Dept: DERMATOLOGY CLINIC | Facility: CLINIC | Age: 86
End: 2021-07-02
Payer: MEDICARE

## 2021-07-02 DIAGNOSIS — D23.9 BENIGN NEOPLASM OF SKIN, UNSPECIFIED LOCATION: ICD-10-CM

## 2021-07-02 DIAGNOSIS — D48.5 NEOPLASM OF UNCERTAIN BEHAVIOR OF SKIN: Primary | ICD-10-CM

## 2021-07-02 DIAGNOSIS — L82.0 INFLAMED SEBORRHEIC KERATOSIS: ICD-10-CM

## 2021-07-02 DIAGNOSIS — L72.0 EPIDERMAL CYST: ICD-10-CM

## 2021-07-02 DIAGNOSIS — Z85.828 HISTORY OF SKIN CANCER: ICD-10-CM

## 2021-07-02 DIAGNOSIS — L57.0 AK (ACTINIC KERATOSIS): ICD-10-CM

## 2021-07-02 DIAGNOSIS — Z85.820 PERSONAL HISTORY OF MALIGNANT MELANOMA OF SKIN: ICD-10-CM

## 2021-07-02 DIAGNOSIS — L82.1 SEBORRHEIC KERATOSES: ICD-10-CM

## 2021-07-02 PROCEDURE — 99213 OFFICE O/P EST LOW 20 MIN: CPT | Performed by: DERMATOLOGY

## 2021-07-02 PROCEDURE — 11102 TANGNTL BX SKIN SINGLE LES: CPT | Performed by: DERMATOLOGY

## 2021-07-02 PROCEDURE — 88304 TISSUE EXAM BY PATHOLOGIST: CPT | Performed by: DERMATOLOGY

## 2021-07-02 PROCEDURE — 11421 EXC H-F-NK-SP B9+MARG 0.6-1: CPT | Performed by: DERMATOLOGY

## 2021-07-02 PROCEDURE — 88305 TISSUE EXAM BY PATHOLOGIST: CPT | Performed by: DERMATOLOGY

## 2021-07-02 RX ORDER — NITROFURANTOIN MACROCRYSTALS 50 MG/1
CAPSULE ORAL
COMMUNITY
Start: 2021-03-24 | End: 2021-12-06

## 2021-07-02 RX ORDER — BACITRACIN 500 [USP'U]/G
OINTMENT OPHTHALMIC
COMMUNITY
Start: 2021-04-01 | End: 2021-11-11

## 2021-07-02 RX ORDER — PREDNISOLONE ACETATE 10 MG/ML
SUSPENSION/ DROPS OPHTHALMIC
COMMUNITY
Start: 2021-04-01 | End: 2021-12-06

## 2021-07-08 ENCOUNTER — TELEPHONE (OUTPATIENT)
Dept: DERMATOLOGY CLINIC | Facility: CLINIC | Age: 86
End: 2021-07-08

## 2021-07-08 NOTE — TELEPHONE ENCOUNTER
Patient called    Asking for test results. Also wants to know if she can take the sutures out herself.  Please call

## 2021-07-09 NOTE — TELEPHONE ENCOUNTER
Pt called and informed of results and recommendations. Verbalized understanding. [employer] message sent as well.

## 2021-07-09 NOTE — PROGRESS NOTES
Pt called back, verbalized understanding of results and recommendations. [employer] message sent as well with information typed to pt.

## 2021-07-09 NOTE — PROGRESS NOTES
The pathology report from last visit showed cyst left neck--no further treatment needed. BCC needs further excision. Either Mohs, which would be the best option- Dr. Arlin Phan, or excision with Dr. Tejal Hernandez- Plastics. Please log in test results.   Please

## 2021-07-11 NOTE — PROGRESS NOTES
Operative Report                     Shave/  Tangential biopsy     Clinical diagnosis:    Size of lesion:    Location:right lateral neck  Spec 1 Comment: r/o BCC pearly plaque 1cm with sclerotic base    Procedure:     With patient in appropriate posit

## 2021-07-11 NOTE — PROGRESS NOTES
Karli Mallory is a 80year old female. HPI:     CC:  Patient presents with:  Lesion: LOV 9/26/20. pt presenting today with lesion to L side of neck for over a year. Lesion has changed in size. c/o irrtiation.  pt has HX of AK's,BCC,Melanoma (SYNTHROID, LEVOTHROID) 75 MCG Oral Tab      • Calcium Carbonate 1250 MG Oral Cap Take 500 capsules by mouth.        • bacitracin 500 UNIT/GM Ophthalmic Ointment Apply 14 inch strip to left upper eyelid twice daily after procedure (Patient not taking: Repor Marital status:        Spouse name: Not on file      Number of children: Not on file      Years of education: Not on file      Highest education level: Not on file    Occupational History      Not on file    Tobacco Use      Smoking status: Never Smo with:  Lesion: LOV 9/26/20. pt presenting today with lesion to L side of neck for over a year. Lesion has changed in size. c/o irrtiation.  pt has HX of AK's,BCC,Melanoma    Past notes/ records and appropriate/relevant lab results including pathology and pa encounter diagnosis)  Ak (actinic keratosis)  History of skin cancer  Inflamed seborrheic keratosis  Personal history of malignant melanoma of skin  Seborrheic keratoses  Benign neoplasm of skin, unspecified location    See details on map.       Remarkable Prominent arthritis    Left dorsal third digit with verrucous papule consider additional cryo. Overall warts on the hands are better. Plantar lesions stable    Eyelid dermatitis has been stable topicals as needed  . No other suspicious lesions noted.   Maribell Dooley

## 2021-07-11 NOTE — PROGRESS NOTES
Operative Report                                                                      Punch excision    Clinical diagnosis:epidermal cyst  Size of lesion:   Location: left anterior n

## 2021-09-02 ENCOUNTER — MED REC SCAN ONLY (OUTPATIENT)
Dept: DERMATOLOGY CLINIC | Facility: CLINIC | Age: 86
End: 2021-09-02

## 2021-09-27 ENCOUNTER — OFFICE VISIT (OUTPATIENT)
Dept: DERMATOLOGY CLINIC | Facility: CLINIC | Age: 86
End: 2021-09-27
Payer: MEDICARE

## 2021-09-27 DIAGNOSIS — D48.5 NEOPLASM OF UNCERTAIN BEHAVIOR OF SKIN: Primary | ICD-10-CM

## 2021-09-27 DIAGNOSIS — L57.0 AK (ACTINIC KERATOSIS): ICD-10-CM

## 2021-09-27 DIAGNOSIS — Z85.828 HISTORY OF SKIN CANCER: ICD-10-CM

## 2021-09-27 DIAGNOSIS — Z85.820 PERSONAL HISTORY OF MALIGNANT MELANOMA OF SKIN: ICD-10-CM

## 2021-09-27 DIAGNOSIS — L82.1 SEBORRHEIC KERATOSES: ICD-10-CM

## 2021-09-27 PROCEDURE — 99213 OFFICE O/P EST LOW 20 MIN: CPT | Performed by: DERMATOLOGY

## 2021-09-27 PROCEDURE — 11103 TANGNTL BX SKIN EA SEP/ADDL: CPT | Performed by: DERMATOLOGY

## 2021-09-27 PROCEDURE — 11102 TANGNTL BX SKIN SINGLE LES: CPT | Performed by: DERMATOLOGY

## 2021-09-27 PROCEDURE — 88305 TISSUE EXAM BY PATHOLOGIST: CPT | Performed by: DERMATOLOGY

## 2021-09-28 RX ORDER — AMLODIPINE BESYLATE 5 MG/1
5 TABLET ORAL 2 TIMES DAILY
Qty: 180 TABLET | Refills: 1 | Status: SHIPPED | OUTPATIENT
Start: 2021-09-28

## 2021-09-30 NOTE — PROGRESS NOTES
The pathology report from last visit showed  both lesions bening IDN irritated r temple and sk post neck no cancer . Please log in test results. Please call patient and inform of results and recommendations.  (please add to history).   Pt to  rtc for rout

## 2021-09-30 NOTE — PROGRESS NOTES
Logged in path book and PMH. Pt contacted, confirmed name, and . Pt verbalizes understanding, and denies further questions.

## 2021-10-10 NOTE — PROGRESS NOTES
Maria C Fitzpatrick is a 80year old female.   HPI:     CC:  Patient presents with:  Full Skin Exam: pt presents for full body exam,  sister HX of melanoma BCC, personal HX of melanoma BCC AK's, LOV         Allergies:  Dextrose, Levofloxacin, Sulfa Antib 20 MG Oral Tab      • Levothyroxine Sodium (SYNTHROID, LEVOTHROID) 75 MCG Oral Tab      • Calcium Carbonate 1250 MG Oral Cap Take 500 capsules by mouth. • amLODIPine 5 MG Oral Tab Take 1 tablet (5 mg total) by mouth 2 (two) times daily.  180 tablet 1 Marital status:        Spouse name: Not on file      Number of children: Not on file      Years of education: Not on file      Highest education level: Not on file    Occupational History      Not on file    Tobacco Use      Smoking status: Never in the Last Year: Not on file      Number of Places Lived in the Last Year: Not on file      Unstable Housing in the Last Year: Not on file  Family History   Problem Relation Age of Onset   • Cancer Sister         melanoma   • Melanoma Sister    • Cancer S of examination  See Assessment /Plan for additional history and physical exam also:    Assessment / plan:    Orders Placed This Encounter      Pathology Tissue P      Meds & Refills for this Visit:  Requested Prescriptions      No prescriptions requested o Other words stable. Appear clear. More ganglion changes over distal IP joints, arthritis prominent changes. Prominent arthritis    Left dorsal third digit with verrucous papule improved consider additional cryo. Overall warts on the hands are better.

## 2021-12-01 RX ORDER — HYDRALAZINE HYDROCHLORIDE 25 MG/1
25 TABLET, FILM COATED ORAL 3 TIMES DAILY
COMMUNITY

## 2021-12-01 NOTE — H&P
The above referenced H&P was reviewed by Loreen Kayser, MD on 12/6/2021, the patient was examined and no significant changes have occurred in the patient's condition since the H&P was performed.   Risks and benefits were discussed, proceed with procedure as

## 2021-12-03 ENCOUNTER — NURSE ONLY (OUTPATIENT)
Dept: LAB | Age: 86
End: 2021-12-03
Attending: INTERNAL MEDICINE
Payer: MEDICARE

## 2021-12-03 ENCOUNTER — HOSPITAL ENCOUNTER (OUTPATIENT)
Dept: GENERAL RADIOLOGY | Age: 86
Discharge: HOME | End: 2021-12-03
Attending: INTERNAL MEDICINE
Payer: MEDICARE

## 2021-12-03 ENCOUNTER — LAB ENCOUNTER (OUTPATIENT)
Dept: LAB | Age: 86
End: 2021-12-03
Attending: INTERNAL MEDICINE
Payer: MEDICARE

## 2021-12-03 DIAGNOSIS — Z01.818 PRE-OP TESTING: ICD-10-CM

## 2021-12-03 PROCEDURE — 93005 ELECTROCARDIOGRAM TRACING: CPT

## 2021-12-03 PROCEDURE — 36415 COLL VENOUS BLD VENIPUNCTURE: CPT

## 2021-12-03 PROCEDURE — 85027 COMPLETE CBC AUTOMATED: CPT

## 2021-12-03 PROCEDURE — 80048 BASIC METABOLIC PNL TOTAL CA: CPT

## 2021-12-03 PROCEDURE — 85610 PROTHROMBIN TIME: CPT

## 2021-12-03 PROCEDURE — 71046 X-RAY EXAM CHEST 2 VIEWS: CPT | Performed by: INTERNAL MEDICINE

## 2021-12-03 PROCEDURE — 93010 ELECTROCARDIOGRAM REPORT: CPT | Performed by: INTERNAL MEDICINE

## 2021-12-06 ENCOUNTER — HOSPITAL ENCOUNTER (OUTPATIENT)
Dept: INTERVENTIONAL RADIOLOGY/VASCULAR | Facility: HOSPITAL | Age: 86
Discharge: HOME | End: 2021-12-06
Attending: INTERNAL MEDICINE | Admitting: INTERNAL MEDICINE
Payer: MEDICARE

## 2021-12-06 VITALS
RESPIRATION RATE: 18 BRPM | SYSTOLIC BLOOD PRESSURE: 156 MMHG | BODY MASS INDEX: 30.73 KG/M2 | WEIGHT: 180 LBS | DIASTOLIC BLOOD PRESSURE: 72 MMHG | TEMPERATURE: 98 F | HEIGHT: 64 IN | HEART RATE: 60 BPM | OXYGEN SATURATION: 91 %

## 2021-12-06 DIAGNOSIS — Z01.818 PRE-OP TESTING: Primary | ICD-10-CM

## 2021-12-06 DIAGNOSIS — I25.2 HX OF ACUTE MYOCARDIAL INFARCTION: ICD-10-CM

## 2021-12-06 DIAGNOSIS — I20.0 ACCELERATING ANGINA (HCC): ICD-10-CM

## 2021-12-06 DIAGNOSIS — R06.02 SOB (SHORTNESS OF BREATH): ICD-10-CM

## 2021-12-06 DIAGNOSIS — I25.10 CAD (CORONARY ARTERY DISEASE): ICD-10-CM

## 2021-12-06 PROCEDURE — 4A023N8 MEASUREMENT OF CARDIAC SAMPLING AND PRESSURE, BILATERAL, PERCUTANEOUS APPROACH: ICD-10-PCS | Performed by: INTERNAL MEDICINE

## 2021-12-06 PROCEDURE — 93460 R&L HRT ART/VENTRICLE ANGIO: CPT

## 2021-12-06 PROCEDURE — 99152 MOD SED SAME PHYS/QHP 5/>YRS: CPT

## 2021-12-06 PROCEDURE — 93010 ELECTROCARDIOGRAM REPORT: CPT | Performed by: INTERNAL MEDICINE

## 2021-12-06 PROCEDURE — 4A033BC MEASUREMENT OF ARTERIAL PRESSURE, CORONARY, PERCUTANEOUS APPROACH: ICD-10-PCS | Performed by: INTERNAL MEDICINE

## 2021-12-06 PROCEDURE — 93005 ELECTROCARDIOGRAM TRACING: CPT

## 2021-12-06 PROCEDURE — 99153 MOD SED SAME PHYS/QHP EA: CPT

## 2021-12-06 PROCEDURE — 93571 IV DOP VEL&/PRESS C FLO 1ST: CPT

## 2021-12-06 PROCEDURE — B2111ZZ FLUOROSCOPY OF MULTIPLE CORONARY ARTERIES USING LOW OSMOLAR CONTRAST: ICD-10-PCS | Performed by: INTERNAL MEDICINE

## 2021-12-06 PROCEDURE — B2151ZZ FLUOROSCOPY OF LEFT HEART USING LOW OSMOLAR CONTRAST: ICD-10-PCS | Performed by: INTERNAL MEDICINE

## 2021-12-06 RX ORDER — ASPIRIN 81 MG/1
TABLET, CHEWABLE ORAL
Status: COMPLETED
Start: 2021-12-06 | End: 2021-12-06

## 2021-12-06 RX ORDER — ASPIRIN 81 MG/1
TABLET, CHEWABLE ORAL
Status: DISCONTINUED
Start: 2021-12-06 | End: 2021-12-06

## 2021-12-06 RX ORDER — SODIUM CHLORIDE 9 MG/ML
INJECTION, SOLUTION INTRAVENOUS
Status: DISCONTINUED | OUTPATIENT
Start: 2021-12-06 | End: 2021-12-06

## 2021-12-06 RX ORDER — MIDAZOLAM HYDROCHLORIDE 1 MG/ML
INJECTION INTRAMUSCULAR; INTRAVENOUS
Status: COMPLETED
Start: 2021-12-06 | End: 2021-12-06

## 2021-12-06 RX ORDER — HEPARIN SODIUM 1000 [USP'U]/ML
INJECTION, SOLUTION INTRAVENOUS; SUBCUTANEOUS
Status: COMPLETED
Start: 2021-12-06 | End: 2021-12-06

## 2021-12-06 RX ORDER — LIDOCAINE HYDROCHLORIDE 20 MG/ML
INJECTION, SOLUTION EPIDURAL; INFILTRATION; INTRACAUDAL; PERINEURAL
Status: COMPLETED
Start: 2021-12-06 | End: 2021-12-06

## 2021-12-06 RX ADMIN — ASPIRIN 324 MG: 81 TABLET, CHEWABLE ORAL at 06:59:00

## 2021-12-06 NOTE — IVS NOTE
DISCHARGE NOTE     Pt is able to sit up and ambulate without difficulty. Pt voided and tolerated fluids and food. Right groin procedural site remains dry and intact with good circulation, motion, and sensation.    No signs and symptoms of bleeding/hemat

## 2021-12-06 NOTE — PROCEDURES
Cardiologist: Renaldo Cranker, MD  Primary Proceduralist: Renaldo Cranker, MD  Procedure Performed: RHC, COR and iFR of LAD  Date of Procedure: 12/6/2021   Indication: Shortness of breath on exertion, known history of CAD    Summary of findings:  Moderate LAD d wire   Wire advanced outside the guide catheter, wire zeroed and advanced through lesion.   iFR measurement taken, 0.94 (not significant)      Specimen sent to: No specimen collected  Estimated blood loss: 10 cc  Closure: Mynx for artery and manual for vein

## 2021-12-15 ENCOUNTER — OFFICE VISIT (OUTPATIENT)
Dept: CARDIOLOGY CLINIC | Facility: CLINIC | Age: 86
End: 2021-12-15
Payer: MEDICARE

## 2021-12-15 VITALS
SYSTOLIC BLOOD PRESSURE: 156 MMHG | OXYGEN SATURATION: 88 % | BODY MASS INDEX: 31.92 KG/M2 | HEART RATE: 76 BPM | RESPIRATION RATE: 18 BRPM | DIASTOLIC BLOOD PRESSURE: 75 MMHG | HEIGHT: 64 IN | WEIGHT: 187 LBS

## 2021-12-15 DIAGNOSIS — R06.00 DYSPNEA, UNSPECIFIED TYPE: Primary | ICD-10-CM

## 2021-12-15 PROCEDURE — 99214 OFFICE O/P EST MOD 30 MIN: CPT | Performed by: NURSE PRACTITIONER

## 2021-12-15 PROCEDURE — 1111F DSCHRG MED/CURRENT MED MERGE: CPT | Performed by: NURSE PRACTITIONER

## 2021-12-15 NOTE — PROGRESS NOTES
Jr Tinajero is a 80year old female. Patient presents with: Follow - Up: s/p cath right groin check  Hypertension  Dyspnea: minimal exertion    HPI:   Patient comes in  today for follow-up.  She sees Dr. Elvira Silveira she has history of coronary disease atherosclerosis    • Disorder of thyroid    • Heart attack ([facility]    • High blood pressure    • Hyperthyroidism 2010    per  redioactive iodine therapy   • Intradermal nevus 09/2021    temple   • Melanoma ([facility] 2013    right shoulder 0.42mm   • Myocardial

## 2021-12-15 NOTE — PATIENT INSTRUCTIONS
1. Continue same medications  2. See pulmonary for shortness of breath   3.  Follow up with Dr. Augusto Collins next year

## 2022-03-25 RX ORDER — AMLODIPINE BESYLATE 5 MG/1
TABLET ORAL
Qty: 180 TABLET | Refills: 3 | OUTPATIENT
Start: 2022-03-25

## 2023-03-09 NOTE — PROGRESS NOTES
Operative Report                     Shave/  Tangential biopsy     Clinical diagnosis:    Size of lesion:    Location:pt with hx skin cancer new lesions  Spec 1 Description >>>>>: right temple  Spec 1 Comment: keratotic papule 4x5mm r/o SCC  Spec 2 D Ultrasound Used Text: Ultrasound was utilized to place radiation therapy fields.

## 2023-12-18 NOTE — TELEPHONE ENCOUNTER
Labs result sent per [facility] CHART. Stress test scheduled for 3/10.
Patient called in to discuss the recent test results and next step in plan of care . Please follow up . Patient also wanted to note that she will not be available between 12:45pm and 2pm today.  Thank you
Normal for race

## (undated) NOTE — MR AVS SNAPSHOT
[address] [address] [facility] [phone]               Thank you for choosing us for your health care visit with [address]   We are glad to serve you and happy to provide you with [facility] Commonly known as:  PRED FORTE           simvastatin 20 MG Tabs   Commonly known as:  Fernanda Winn                  Visit [facility] online at  [email or web address]

## (undated) NOTE — MR AVS SNAPSHOT
[facility] SPECIALTY [facility] [place] [address] [address] [id number] [phone]               Thank you for choosing us for your health care visit with Dee Gonzales MD.  We are glad to serve you and happy to provide you with this summary of PLAVIX 75 MG Tabs   Generic drug:  Clopidogrel Bisulfate   Take  by mouth.           prednisoLONE acetate 1 % Susp   Commonly known as:  PRED FORTE           simvastatin 20 MG Tabs   Commonly known as:  ZOCOR                   [facility]